# Patient Record
Sex: FEMALE | Race: WHITE | Employment: OTHER
[De-identification: names, ages, dates, MRNs, and addresses within clinical notes are randomized per-mention and may not be internally consistent; named-entity substitution may affect disease eponyms.]

---

## 2017-01-27 ENCOUNTER — TELEPHONE (OUTPATIENT)
Dept: INTERNAL MEDICINE | Facility: CLINIC | Age: 62
End: 2017-01-27

## 2017-01-28 RX ORDER — CIPROFLOXACIN 500 MG/1
500 TABLET, FILM COATED ORAL 2 TIMES DAILY
Qty: 20 TABLET | Refills: 0 | Status: SHIPPED | OUTPATIENT
Start: 2017-01-28 | End: 2017-03-17

## 2017-02-27 ENCOUNTER — HOSPITAL ENCOUNTER (OUTPATIENT)
Age: 62
Setting detail: SPECIMEN
Discharge: HOME | End: 2017-02-27
Payer: COMMERCIAL

## 2017-02-27 ENCOUNTER — OFFICE VISIT (OUTPATIENT)
Dept: OBGYN | Facility: CLINIC | Age: 62
End: 2017-02-27

## 2017-02-27 VITALS
RESPIRATION RATE: 20 BRPM | HEIGHT: 67 IN | WEIGHT: 182 LBS | BODY MASS INDEX: 28.56 KG/M2 | SYSTOLIC BLOOD PRESSURE: 140 MMHG | HEART RATE: 74 BPM | DIASTOLIC BLOOD PRESSURE: 78 MMHG

## 2017-02-27 DIAGNOSIS — N76.0 ACUTE VAGINITIS: ICD-10-CM

## 2017-02-27 DIAGNOSIS — Z12.39 ENCOUNTER FOR BREAST CANCER SCREENING OTHER THAN MAMMOGRAM: ICD-10-CM

## 2017-02-27 DIAGNOSIS — Z01.419 WELL WOMAN EXAM WITH ROUTINE GYNECOLOGICAL EXAM: Primary | ICD-10-CM

## 2017-02-27 DIAGNOSIS — Z90.710 H/O HYSTERECTOMY FOR BENIGN DISEASE: ICD-10-CM

## 2017-02-27 DIAGNOSIS — Z12.11 SCREEN FOR COLON CANCER: ICD-10-CM

## 2017-02-27 LAB
DIRECT EXAM: NORMAL
Lab: NORMAL
SPECIMEN DESCRIPTION: NORMAL
SPECIMEN DESCRIPTION: NORMAL
STATUS: NORMAL

## 2017-02-27 PROCEDURE — 87660 TRICHOMONAS VAGIN DIR PROBE: CPT

## 2017-02-27 PROCEDURE — 87510 GARDNER VAG DNA DIR PROBE: CPT

## 2017-02-27 PROCEDURE — 87480 CANDIDA DNA DIR PROBE: CPT

## 2017-02-27 PROCEDURE — 99386 PREV VISIT NEW AGE 40-64: CPT | Performed by: NURSE PRACTITIONER

## 2017-02-27 ASSESSMENT — PATIENT HEALTH QUESTIONNAIRE - PHQ9
SUM OF ALL RESPONSES TO PHQ9 QUESTIONS 1 & 2: 0
2. FEELING DOWN, DEPRESSED OR HOPELESS: 0
1. LITTLE INTEREST OR PLEASURE IN DOING THINGS: 0
SUM OF ALL RESPONSES TO PHQ QUESTIONS 1-9: 0

## 2017-03-17 ENCOUNTER — OFFICE VISIT (OUTPATIENT)
Dept: INTERNAL MEDICINE CLINIC | Age: 62
End: 2017-03-17
Payer: COMMERCIAL

## 2017-03-17 ENCOUNTER — HOSPITAL ENCOUNTER (OUTPATIENT)
Dept: WOMENS IMAGING | Age: 62
Discharge: HOME | End: 2017-03-17
Payer: COMMERCIAL

## 2017-03-17 VITALS
BODY MASS INDEX: 31.23 KG/M2 | DIASTOLIC BLOOD PRESSURE: 60 MMHG | WEIGHT: 199 LBS | SYSTOLIC BLOOD PRESSURE: 138 MMHG | HEIGHT: 67 IN

## 2017-03-17 DIAGNOSIS — E11.9 TYPE 2 DIABETES MELLITUS WITHOUT COMPLICATION, WITHOUT LONG-TERM CURRENT USE OF INSULIN (HCC): ICD-10-CM

## 2017-03-17 DIAGNOSIS — Z12.11 SCREENING FOR COLON CANCER: Primary | ICD-10-CM

## 2017-03-17 DIAGNOSIS — E83.52 HYPERCALCEMIA: ICD-10-CM

## 2017-03-17 DIAGNOSIS — E89.0 POSTSURGICAL HYPOTHYROIDISM: ICD-10-CM

## 2017-03-17 DIAGNOSIS — Z01.419 WELL WOMAN EXAM WITH ROUTINE GYNECOLOGICAL EXAM: ICD-10-CM

## 2017-03-17 DIAGNOSIS — J43.1 PANLOBULAR EMPHYSEMA (HCC): ICD-10-CM

## 2017-03-17 DIAGNOSIS — K21.9 GASTROESOPHAGEAL REFLUX DISEASE WITHOUT ESOPHAGITIS: ICD-10-CM

## 2017-03-17 DIAGNOSIS — Z12.39 ENCOUNTER FOR BREAST CANCER SCREENING OTHER THAN MAMMOGRAM: ICD-10-CM

## 2017-03-17 PROCEDURE — 99214 OFFICE O/P EST MOD 30 MIN: CPT | Performed by: INTERNAL MEDICINE

## 2017-03-17 PROCEDURE — 77063 BREAST TOMOSYNTHESIS BI: CPT

## 2017-03-17 RX ORDER — IPRATROPIUM BROMIDE 21 UG/1
2 SPRAY, METERED NASAL 3 TIMES DAILY
Qty: 1 BOTTLE | Refills: 3 | Status: SHIPPED | OUTPATIENT
Start: 2017-03-17 | End: 2018-10-18

## 2017-03-17 ASSESSMENT — ENCOUNTER SYMPTOMS
PHOTOPHOBIA: 0
FACIAL SWELLING: 0
STRIDOR: 0
COLOR CHANGE: 0
SHORTNESS OF BREATH: 1
BLOOD IN STOOL: 0
RHINORRHEA: 0
CONSTIPATION: 0
VOMITING: 0
BACK PAIN: 0
ABDOMINAL PAIN: 0
EYE ITCHING: 0
VOICE CHANGE: 0
TROUBLE SWALLOWING: 0
EYE PAIN: 0
DIARRHEA: 0
WHEEZING: 0
COUGH: 0
ABDOMINAL DISTENTION: 0
NAUSEA: 0
APNEA: 0
ANAL BLEEDING: 0
EYE DISCHARGE: 0
SORE THROAT: 0
CHOKING: 0
CHEST TIGHTNESS: 0
RECTAL PAIN: 0
SINUS PRESSURE: 0
EYE REDNESS: 0

## 2017-03-17 ASSESSMENT — PATIENT HEALTH QUESTIONNAIRE - PHQ9
1. LITTLE INTEREST OR PLEASURE IN DOING THINGS: 0
SUM OF ALL RESPONSES TO PHQ QUESTIONS 1-9: 0
2. FEELING DOWN, DEPRESSED OR HOPELESS: 0
SUM OF ALL RESPONSES TO PHQ9 QUESTIONS 1 & 2: 0

## 2017-04-17 ENCOUNTER — OFFICE VISIT (OUTPATIENT)
Dept: INTERNAL MEDICINE CLINIC | Age: 62
End: 2017-04-17
Payer: COMMERCIAL

## 2017-04-17 ENCOUNTER — HOSPITAL ENCOUNTER (OUTPATIENT)
Age: 62
Setting detail: SPECIMEN
Discharge: HOME | End: 2017-04-17
Payer: COMMERCIAL

## 2017-04-17 VITALS
DIASTOLIC BLOOD PRESSURE: 68 MMHG | HEIGHT: 67 IN | WEIGHT: 161 LBS | BODY MASS INDEX: 25.27 KG/M2 | SYSTOLIC BLOOD PRESSURE: 130 MMHG | HEART RATE: 89 BPM

## 2017-04-17 DIAGNOSIS — E11.9 TYPE 2 DIABETES MELLITUS WITHOUT COMPLICATION, WITHOUT LONG-TERM CURRENT USE OF INSULIN (HCC): Primary | ICD-10-CM

## 2017-04-17 DIAGNOSIS — I35.1 AORTIC EJECTION MURMUR: ICD-10-CM

## 2017-04-17 DIAGNOSIS — N30.01 ACUTE CYSTITIS WITH HEMATURIA: ICD-10-CM

## 2017-04-17 LAB — HBA1C MFR BLD: 6.4 %

## 2017-04-17 PROCEDURE — 99214 OFFICE O/P EST MOD 30 MIN: CPT | Performed by: INTERNAL MEDICINE

## 2017-04-17 PROCEDURE — 83036 HEMOGLOBIN GLYCOSYLATED A1C: CPT | Performed by: INTERNAL MEDICINE

## 2017-04-17 RX ORDER — CIPROFLOXACIN 500 MG/1
500 TABLET, FILM COATED ORAL 2 TIMES DAILY
Qty: 20 TABLET | Refills: 0 | Status: SHIPPED | OUTPATIENT
Start: 2017-04-17 | End: 2017-07-13 | Stop reason: SDUPTHER

## 2017-04-18 LAB
-: ABNORMAL
AMORPHOUS: ABNORMAL
BACTERIA: ABNORMAL
BILIRUBIN URINE: NEGATIVE
CASTS UA: ABNORMAL /LPF (ref 0–2)
COLOR: ABNORMAL
COMMENT UA: ABNORMAL
CRYSTALS, UA: ABNORMAL /HPF
EPITHELIAL CELLS UA: ABNORMAL /HPF (ref 0–5)
GLUCOSE URINE: ABNORMAL
KETONES, URINE: ABNORMAL
LEUKOCYTE ESTERASE, URINE: NEGATIVE
MUCUS: ABNORMAL
NITRITE, URINE: NEGATIVE
OTHER OBSERVATIONS UA: ABNORMAL
PH UA: 6 (ref 5–8)
PROTEIN UA: NEGATIVE
RBC UA: ABNORMAL /HPF (ref 0–2)
RENAL EPITHELIAL, UA: ABNORMAL /HPF
SPECIFIC GRAVITY UA: 1.03 (ref 1–1.03)
TRICHOMONAS: ABNORMAL
TURBIDITY: ABNORMAL
URINE HGB: NEGATIVE
UROBILINOGEN, URINE: NORMAL
WBC UA: ABNORMAL /HPF (ref 0–5)
YEAST: ABNORMAL

## 2017-04-19 LAB
CULTURE: NORMAL
CULTURE: NORMAL
Lab: NORMAL
SPECIMEN DESCRIPTION: NORMAL
STATUS: NORMAL

## 2017-04-23 ASSESSMENT — ENCOUNTER SYMPTOMS
APNEA: 0
EYE ITCHING: 0
EYE REDNESS: 0
EYE PAIN: 0
CHOKING: 0
BACK PAIN: 0
CHEST TIGHTNESS: 0
EYE DISCHARGE: 0
ABDOMINAL DISTENTION: 0
ABDOMINAL PAIN: 1
COUGH: 0
ANAL BLEEDING: 0

## 2017-05-05 ENCOUNTER — HOSPITAL ENCOUNTER (OUTPATIENT)
Dept: CT IMAGING | Facility: CLINIC | Age: 62
Discharge: HOME | End: 2017-05-05
Payer: COMMERCIAL

## 2017-05-05 DIAGNOSIS — N30.01 ACUTE CYSTITIS WITH HEMATURIA: ICD-10-CM

## 2017-05-05 PROCEDURE — 74176 CT ABD & PELVIS W/O CONTRAST: CPT

## 2017-06-30 ENCOUNTER — OFFICE VISIT (OUTPATIENT)
Dept: INTERNAL MEDICINE CLINIC | Age: 62
End: 2017-06-30
Payer: COMMERCIAL

## 2017-06-30 VITALS
HEIGHT: 67 IN | SYSTOLIC BLOOD PRESSURE: 130 MMHG | DIASTOLIC BLOOD PRESSURE: 68 MMHG | BODY MASS INDEX: 29.66 KG/M2 | WEIGHT: 189 LBS

## 2017-06-30 DIAGNOSIS — E89.0 POSTSURGICAL HYPOTHYROIDISM: ICD-10-CM

## 2017-06-30 DIAGNOSIS — J43.1 PANLOBULAR EMPHYSEMA (HCC): ICD-10-CM

## 2017-06-30 DIAGNOSIS — K21.9 GASTROESOPHAGEAL REFLUX DISEASE WITHOUT ESOPHAGITIS: ICD-10-CM

## 2017-06-30 DIAGNOSIS — Z13.9 SCREENING: ICD-10-CM

## 2017-06-30 DIAGNOSIS — E11.9 TYPE 2 DIABETES MELLITUS WITHOUT COMPLICATION, WITHOUT LONG-TERM CURRENT USE OF INSULIN (HCC): Primary | ICD-10-CM

## 2017-06-30 DIAGNOSIS — R10.84 GENERALIZED ABDOMINAL PAIN: ICD-10-CM

## 2017-06-30 PROCEDURE — 99214 OFFICE O/P EST MOD 30 MIN: CPT | Performed by: INTERNAL MEDICINE

## 2017-06-30 ASSESSMENT — ENCOUNTER SYMPTOMS
BLOOD IN STOOL: 0
RHINORRHEA: 0
SINUS PRESSURE: 0
CHEST TIGHTNESS: 0
BACK PAIN: 0
RECTAL PAIN: 0
ANAL BLEEDING: 0
CONSTIPATION: 0
EYE REDNESS: 0
NAUSEA: 0
EYE ITCHING: 0
SHORTNESS OF BREATH: 1
COUGH: 0
EYE DISCHARGE: 0
EYE PAIN: 0
VOMITING: 0
DIARRHEA: 0
WHEEZING: 0
VOICE CHANGE: 0
PHOTOPHOBIA: 0
STRIDOR: 0
COLOR CHANGE: 0
FACIAL SWELLING: 0
ABDOMINAL DISTENTION: 0
ABDOMINAL PAIN: 0
APNEA: 0
SORE THROAT: 0
CHOKING: 0
TROUBLE SWALLOWING: 0

## 2017-07-13 DIAGNOSIS — N30.01 ACUTE CYSTITIS WITH HEMATURIA: ICD-10-CM

## 2017-07-14 RX ORDER — CIPROFLOXACIN 500 MG/1
TABLET, FILM COATED ORAL
Qty: 20 TABLET | Refills: 0 | Status: SHIPPED | OUTPATIENT
Start: 2017-07-14 | End: 2018-04-24

## 2017-10-27 ENCOUNTER — OFFICE VISIT (OUTPATIENT)
Dept: INTERNAL MEDICINE CLINIC | Age: 62
End: 2017-10-27
Payer: COMMERCIAL

## 2017-10-27 VITALS
BODY MASS INDEX: 28.41 KG/M2 | DIASTOLIC BLOOD PRESSURE: 70 MMHG | WEIGHT: 181 LBS | HEIGHT: 67 IN | SYSTOLIC BLOOD PRESSURE: 136 MMHG

## 2017-10-27 DIAGNOSIS — E89.0 POSTOPERATIVE HYPOTHYROIDISM: ICD-10-CM

## 2017-10-27 DIAGNOSIS — E11.9 TYPE 2 DIABETES MELLITUS WITHOUT COMPLICATION, WITHOUT LONG-TERM CURRENT USE OF INSULIN (HCC): Primary | ICD-10-CM

## 2017-10-27 DIAGNOSIS — Z12.11 SCREENING FOR COLON CANCER: ICD-10-CM

## 2017-10-27 DIAGNOSIS — K21.9 GASTROESOPHAGEAL REFLUX DISEASE WITHOUT ESOPHAGITIS: ICD-10-CM

## 2017-10-27 DIAGNOSIS — J43.1 PANLOBULAR EMPHYSEMA (HCC): ICD-10-CM

## 2017-10-27 PROCEDURE — 99214 OFFICE O/P EST MOD 30 MIN: CPT | Performed by: INTERNAL MEDICINE

## 2017-10-27 ASSESSMENT — ENCOUNTER SYMPTOMS
STRIDOR: 0
DIARRHEA: 0
CHOKING: 0
ANAL BLEEDING: 0
RECTAL PAIN: 0
PHOTOPHOBIA: 0
NAUSEA: 0
APNEA: 0
BLOOD IN STOOL: 0
COLOR CHANGE: 0
EYE ITCHING: 0
BACK PAIN: 0
RHINORRHEA: 1
SORE THROAT: 0
CHEST TIGHTNESS: 0
EYE PAIN: 0
EYE DISCHARGE: 0
VOICE CHANGE: 0
SHORTNESS OF BREATH: 0
SINUS PRESSURE: 0
TROUBLE SWALLOWING: 0
VOMITING: 0
CONSTIPATION: 0
FACIAL SWELLING: 0
COUGH: 0
EYE REDNESS: 0
ABDOMINAL PAIN: 0
ABDOMINAL DISTENTION: 0
WHEEZING: 0

## 2017-10-27 NOTE — PROGRESS NOTES
Chronic Disease Visit Information    BP Readings from Last 3 Encounters:   06/30/17 130/68   04/17/17 130/68   03/17/17 138/60          Hemoglobin A1C (%)   Date Value   04/17/2017 6.4   10/21/2016 6.6 (H)   02/26/2016 6.8 (H)     Microalb/Crt. Ratio (mcg/mg creat)   Date Value   10/21/2016 6     LDL Cholesterol (mg/dL)   Date Value   10/21/2016 99     HDL (mg/dL)   Date Value   10/21/2016 59     BUN (mg/dL)   Date Value   10/21/2016 12     CREATININE (mg/dL)   Date Value   10/21/2016 0.77     Glucose (mg/dL)   Date Value   10/21/2016 130 (H)   03/09/2012 103            Have you changed or started any medications since your last visit including any over-the-counter medicines, vitamins, or herbal medicines? no   Are you having any side effects from any of your medications? -  no  Have you stopped taking any of your medications? Is so, why? -  no    Have you seen any other physician or provider since your last visit? No  Have you had any other diagnostic tests since your last visit? No  Have you been seen in the emergency room and/or had an admission to a hospital since we last saw you? No  Have you had your annual diabetic retinal (eye) exam? No  Have you had your routine dental cleaning in the past 6 months? no    Have you activated your [employer] account? If not, what are your barriers?  Yes     Patient Care Team:  Leah Bertrand MD as PCP - Bernaaydin Gallagher MD as PCP - [facility] Attributed Provider         Medical History Review  Past Medical, Family, and Social History reviewed and does contribute to the patient presenting condition  Chief Complaint   Patient presents with    COPD     management     Diabetes     last a1c 6.4%     Health Maintenance   Topic Date Due    Hepatitis C screen  1955    HIV screen  12/24/1968    DTaP/Tdap/Td vaccine (1 - Tdap) 12/24/1972    Pneumococcal med risk (1 of 1 - PPSV23) 12/24/1972    Colon cancer screen colonoscopy  12/24/2003    Diabetic foot exam  07/08/2017    Diabetic retinal exam  08/12/2017    Flu vaccine (1) 09/01/2017    Diabetic microalbuminuria test  10/21/2017    Lipid screen  10/21/2017    DEXA (modify frequency per FRAX score)  11/11/2017    Diabetic hemoglobin A1C test  04/17/2018    Breast cancer screen  03/17/2019    Zostavax vaccine  Addressed

## 2017-10-27 NOTE — PROGRESS NOTES
heard.  Pulmonary/Chest: Effort normal. No respiratory distress. She has decreased breath sounds. She has no wheezes. She has no rales. She exhibits no tenderness. Abdominal: Soft. Bowel sounds are normal. She exhibits no distension and no mass. There is no tenderness. There is no rebound and no guarding. Genitourinary: Rectal exam shows guaiac negative stool. No vaginal discharge found. Musculoskeletal: She exhibits tenderness. She exhibits no edema. Lymphadenopathy:     She has no cervical adenopathy. Neurological: She is alert and oriented to person, place, and time. She has normal reflexes. No cranial nerve deficit. She exhibits normal muscle tone. Coordination normal.   Skin: Skin is warm and dry. No rash noted. She is not diaphoretic. No erythema. No pallor. Psychiatric: Her behavior is normal. Judgment and thought content normal. Her mood appears anxious. Results for orders placed or performed during the hospital encounter of 04/17/17   Urine culture clean catch   Result Value Ref Range    Specimen Description . CLEAN CATCH URINE     Special Requests NOT REPORTED     Culture NO SIGNIFICANT GROWTH     Culture       [employer] [address] [place] [address], [address] [place] [address] [phone]    Status FINAL 04/19/2017    UA W/REFLEX CULTURE   Result Value Ref Range    Color, UA DARK YELLOW (A) YEL    Turbidity UA CLOUDY (A) CLEAR    Glucose, Ur 1+ (A) NEG    Bilirubin Urine NEGATIVE NEG    Ketones, Urine TRACE (A) NEG    Specific Gravity, UA 1.029 1.005 - 1.030    Urine Hgb NEGATIVE NEG    pH, UA 6.0 5.0 - 8.0    Protein, UA NEGATIVE NEG    Urobilinogen, Urine Normal NORM    Nitrite, Urine NEGATIVE NEG    Leukocyte Esterase, Urine NEGATIVE NEG    Urinalysis Comments Culture ordered based on defined criteria.     Microscopic Urinalysis   Result Value Ref Range    -          WBC, UA None 0 - 5 /HPF    RBC, UA None 0 - 2 /HPF    Casts UA NOT REPORTED 0 - 2 /LPF    Crystals UA MANY (A) NONE /HPF Epithelial Cells UA 2 TO 5 0 - 5 /HPF    Renal Epithelial, Urine NOT REPORTED 0 /HPF    Bacteria, UA MODERATE (A) NONE    Mucus, UA 2+ (A) NONE    Trichomonas, UA NOT REPORTED NONE    Amorphous, UA NOT REPORTED NONE    Other Observations UA NOT REPORTED NREQ    Yeast, UA NOT REPORTED NONE     No results found. Assessment:     1. Type 2 diabetes mellitus without complication, without long-term current use of insulin (HCC)   DIABETES FOOT EXAM recheck hba1c last 6.4 to get eye exam foot exqam nl    Microalbumin, Ur    Creatinine, Random Urine    Hemoglobin A1C    Lipid Panel    Comprehensive Metabolic Panel    CBC Auto Differential    Urinalysis   2. Panlobular emphysema (HCC)  Baseline cont same   3. Postoperative hypothyroidism  TSH without Reflex recheck tsh cont same dose    Vitamin D 25 Hydroxy   4. Gastroesophageal reflux disease without esophagitis   stable contb same   5. Screening for colon cancer  POCT Fecal Immunochemical Test (FIT)         Plan:     Orders Placed This Encounter   Procedures    Microalbumin, Ur     Standing Status:   Future     Standing Expiration Date:   10/27/2018    Creatinine, Random Urine     Standing Status:   Future     Standing Expiration Date:   10/27/2018    Hemoglobin A1C     Standing Status:   Future     Standing Expiration Date:   10/27/2018    TSH without Reflex     Standing Status:   Future     Standing Expiration Date:   10/27/2018    Lipid Panel     Standing Status:   Future     Standing Expiration Date:   10/27/2018     Order Specific Question:   Is Patient Fasting?/# of Hours     Answer:   12    Comprehensive Metabolic Panel     Standing Status:   Future     Standing Expiration Date:   10/27/2018    CBC Auto Differential     Standing Status:   Future     Standing Expiration Date:   10/27/2018    Urinalysis     Standing Status:   Future     Standing Expiration Date:   10/27/2018     Order Specific Question:   SPECIFY(EX-CATH,MIDSTREAM,CYSTO,ETC)?      Answer:

## 2017-12-26 DIAGNOSIS — E89.0 POSTOPERATIVE HYPOTHYROIDISM: ICD-10-CM

## 2017-12-26 RX ORDER — LEVOTHYROXINE SODIUM 0.12 MG/1
125 TABLET ORAL DAILY
Qty: 90 TABLET | Refills: 3 | Status: SHIPPED | OUTPATIENT
Start: 2017-12-26 | End: 2017-12-29 | Stop reason: SDUPTHER

## 2017-12-29 DIAGNOSIS — E89.0 POSTOPERATIVE HYPOTHYROIDISM: ICD-10-CM

## 2017-12-29 RX ORDER — LEVOTHYROXINE SODIUM 0.12 MG/1
125 TABLET ORAL DAILY
Qty: 90 TABLET | Refills: 3 | Status: SHIPPED | OUTPATIENT
Start: 2017-12-29 | End: 2019-01-08 | Stop reason: SDUPTHER

## 2018-04-05 ENCOUNTER — TELEPHONE (OUTPATIENT)
Dept: INTERNAL MEDICINE CLINIC | Age: 63
End: 2018-04-05

## 2018-04-09 ENCOUNTER — HOSPITAL ENCOUNTER (OUTPATIENT)
Age: 63
Setting detail: SPECIMEN
Discharge: HOME | End: 2018-04-09
Payer: COMMERCIAL

## 2018-04-09 DIAGNOSIS — E11.9 TYPE 2 DIABETES MELLITUS WITHOUT COMPLICATION, WITHOUT LONG-TERM CURRENT USE OF INSULIN (HCC): ICD-10-CM

## 2018-04-09 DIAGNOSIS — E89.0 POSTOPERATIVE HYPOTHYROIDISM: ICD-10-CM

## 2018-04-09 LAB
-: ABNORMAL
ABSOLUTE EOS #: 0.08 K/UL (ref 0–0.44)
ABSOLUTE IMMATURE GRANULOCYTE: <0.03 K/UL (ref 0–0.3)
ABSOLUTE LYMPH #: 1.54 K/UL (ref 1.1–3.7)
ABSOLUTE MONO #: 0.45 K/UL (ref 0.1–1.2)
ALBUMIN SERPL-MCNC: 4.3 G/DL (ref 3.5–5.2)
ALBUMIN/GLOBULIN RATIO: 1.7 (ref 1–2.5)
ALP BLD-CCNC: 81 U/L (ref 35–104)
ALT SERPL-CCNC: 36 U/L (ref 5–33)
AMORPHOUS: ABNORMAL
ANION GAP SERPL CALCULATED.3IONS-SCNC: 11 MMOL/L (ref 9–17)
AST SERPL-CCNC: 26 U/L
BACTERIA: ABNORMAL
BASOPHILS # BLD: 1 % (ref 0–2)
BASOPHILS ABSOLUTE: 0.04 K/UL (ref 0–0.2)
BILIRUB SERPL-MCNC: 0.42 MG/DL (ref 0.3–1.2)
BILIRUBIN URINE: NEGATIVE
BUN BLDV-MCNC: 13 MG/DL (ref 8–23)
BUN/CREAT BLD: ABNORMAL (ref 9–20)
CALCIUM SERPL-MCNC: 8.8 MG/DL (ref 8.6–10.4)
CASTS UA: ABNORMAL /LPF (ref 0–2)
CHLORIDE BLD-SCNC: 102 MMOL/L (ref 98–107)
CHOLESTEROL/HDL RATIO: 2.7
CHOLESTEROL: 166 MG/DL
CO2: 28 MMOL/L (ref 20–31)
COLOR: YELLOW
COMMENT UA: ABNORMAL
CREAT SERPL-MCNC: 0.68 MG/DL (ref 0.5–0.9)
CREATININE URINE: 224.8 MG/DL (ref 28–217)
CRYSTALS, UA: ABNORMAL /HPF
DIFFERENTIAL TYPE: ABNORMAL
EOSINOPHILS RELATIVE PERCENT: 2 % (ref 1–4)
EPITHELIAL CELLS UA: ABNORMAL /HPF (ref 0–5)
ESTIMATED AVERAGE GLUCOSE: 128 MG/DL
GFR AFRICAN AMERICAN: >60 ML/MIN
GFR NON-AFRICAN AMERICAN: >60 ML/MIN
GFR SERPL CREATININE-BSD FRML MDRD: ABNORMAL ML/MIN/{1.73_M2}
GFR SERPL CREATININE-BSD FRML MDRD: ABNORMAL ML/MIN/{1.73_M2}
GLUCOSE BLD-MCNC: 122 MG/DL (ref 70–99)
GLUCOSE URINE: NEGATIVE
HBA1C MFR BLD: 6.1 % (ref 4–6)
HCT VFR BLD CALC: 47.3 % (ref 36.3–47.1)
HDLC SERPL-MCNC: 61 MG/DL
HEMOGLOBIN: 15.3 G/DL (ref 11.9–15.1)
IMMATURE GRANULOCYTES: 0 %
KETONES, URINE: NEGATIVE
LDL CHOLESTEROL: 84 MG/DL (ref 0–130)
LEUKOCYTE ESTERASE, URINE: ABNORMAL
LYMPHOCYTES # BLD: 35 % (ref 24–43)
MCH RBC QN AUTO: 30.7 PG (ref 25.2–33.5)
MCHC RBC AUTO-ENTMCNC: 32.3 G/DL (ref 28.4–34.8)
MCV RBC AUTO: 94.8 FL (ref 82.6–102.9)
MICROALBUMIN/CREAT 24H UR: 15 MG/L
MICROALBUMIN/CREAT UR-RTO: 7 MCG/MG CREAT
MONOCYTES # BLD: 10 % (ref 3–12)
MUCUS: ABNORMAL
NITRITE, URINE: NEGATIVE
NRBC AUTOMATED: 0 PER 100 WBC
OTHER OBSERVATIONS UA: ABNORMAL
PDW BLD-RTO: 12.6 % (ref 11.8–14.4)
PH UA: 6.5 (ref 5–8)
PLATELET # BLD: 226 K/UL (ref 138–453)
PLATELET ESTIMATE: ABNORMAL
PMV BLD AUTO: 11.1 FL (ref 8.1–13.5)
POTASSIUM SERPL-SCNC: 4.5 MMOL/L (ref 3.7–5.3)
PROTEIN UA: NEGATIVE
RBC # BLD: 4.99 M/UL (ref 3.95–5.11)
RBC # BLD: ABNORMAL 10*6/UL
RBC UA: ABNORMAL /HPF (ref 0–2)
RENAL EPITHELIAL, UA: ABNORMAL /HPF
SEG NEUTROPHILS: 52 % (ref 36–65)
SEGMENTED NEUTROPHILS ABSOLUTE COUNT: 2.3 K/UL (ref 1.5–8.1)
SODIUM BLD-SCNC: 141 MMOL/L (ref 135–144)
SPECIFIC GRAVITY UA: 1.02 (ref 1–1.03)
TOTAL PROTEIN: 6.8 G/DL (ref 6.4–8.3)
TRICHOMONAS: ABNORMAL
TRIGL SERPL-MCNC: 105 MG/DL
TSH SERPL DL<=0.05 MIU/L-ACNC: 0.14 MIU/L (ref 0.3–5)
TURBIDITY: CLEAR
URINE HGB: NEGATIVE
UROBILINOGEN, URINE: NORMAL
VITAMIN D 25-HYDROXY: 37.5 NG/ML (ref 30–100)
VLDLC SERPL CALC-MCNC: NORMAL MG/DL (ref 1–30)
WBC # BLD: 4.4 K/UL (ref 3.5–11.3)
WBC # BLD: ABNORMAL 10*3/UL
WBC UA: ABNORMAL /HPF (ref 0–5)
YEAST: ABNORMAL

## 2018-04-21 DIAGNOSIS — Z78.0 MENOPAUSE: ICD-10-CM

## 2018-04-23 DIAGNOSIS — Z78.0 MENOPAUSE: ICD-10-CM

## 2018-04-23 RX ORDER — ESTRADIOL 0.03 MG/D
PATCH TRANSDERMAL
Qty: 12 PATCH | Refills: 4 | Status: SHIPPED | OUTPATIENT
Start: 2018-04-23 | End: 2018-04-23 | Stop reason: SDUPTHER

## 2018-04-23 RX ORDER — ESTRADIOL 0.03 MG/D
1 PATCH TRANSDERMAL WEEKLY
Qty: 12 PATCH | Refills: 4 | Status: SHIPPED | OUTPATIENT
Start: 2018-04-23 | End: 2019-01-08 | Stop reason: SDUPTHER

## 2018-04-24 ENCOUNTER — OFFICE VISIT (OUTPATIENT)
Dept: INTERNAL MEDICINE CLINIC | Age: 63
End: 2018-04-24
Payer: COMMERCIAL

## 2018-04-24 ENCOUNTER — TELEPHONE (OUTPATIENT)
Dept: INTERNAL MEDICINE CLINIC | Age: 63
End: 2018-04-24

## 2018-04-24 VITALS
HEIGHT: 67 IN | DIASTOLIC BLOOD PRESSURE: 62 MMHG | BODY MASS INDEX: 29.82 KG/M2 | SYSTOLIC BLOOD PRESSURE: 130 MMHG | WEIGHT: 190 LBS

## 2018-04-24 DIAGNOSIS — Z12.39 BREAST CANCER SCREENING, HIGH RISK PATIENT: Primary | ICD-10-CM

## 2018-04-24 DIAGNOSIS — J43.1 PANLOBULAR EMPHYSEMA (HCC): ICD-10-CM

## 2018-04-24 DIAGNOSIS — E11.9 TYPE 2 DIABETES MELLITUS WITHOUT COMPLICATION, WITHOUT LONG-TERM CURRENT USE OF INSULIN (HCC): ICD-10-CM

## 2018-04-24 DIAGNOSIS — E89.0 POSTSURGICAL HYPOTHYROIDISM: ICD-10-CM

## 2018-04-24 DIAGNOSIS — Z12.11 SCREENING FOR COLON CANCER: ICD-10-CM

## 2018-04-24 DIAGNOSIS — Z78.0 POST-MENOPAUSAL: ICD-10-CM

## 2018-04-24 DIAGNOSIS — E55.9 VITAMIN D DEFICIENCY: ICD-10-CM

## 2018-04-24 PROCEDURE — 99214 OFFICE O/P EST MOD 30 MIN: CPT | Performed by: INTERNAL MEDICINE

## 2018-04-24 RX ORDER — FLUTICASONE PROPIONATE 50 MCG
1 SPRAY, SUSPENSION (ML) NASAL DAILY
Qty: 1 BOTTLE | Refills: 3 | Status: SHIPPED | OUTPATIENT
Start: 2018-04-24 | End: 2018-04-25

## 2018-04-24 RX ORDER — FLUTICASONE PROPIONATE 50 MCG
1 SPRAY, SUSPENSION (ML) NASAL DAILY
Qty: 1 BOTTLE | Refills: 3 | Status: SHIPPED | OUTPATIENT
Start: 2018-04-24 | End: 2018-10-18

## 2018-04-24 ASSESSMENT — ENCOUNTER SYMPTOMS
EYE PAIN: 0
COUGH: 0
CHEST TIGHTNESS: 0
CHOKING: 0
COLOR CHANGE: 0
DIARRHEA: 0
BACK PAIN: 1
TROUBLE SWALLOWING: 0
NAUSEA: 0
PHOTOPHOBIA: 0
SORE THROAT: 0
VOICE CHANGE: 0
WHEEZING: 0
SINUS PRESSURE: 0
ABDOMINAL DISTENTION: 0
EYE REDNESS: 0
STRIDOR: 0
EYE DISCHARGE: 0
BLOOD IN STOOL: 0
RHINORRHEA: 1
EYE ITCHING: 0
VOMITING: 0
SHORTNESS OF BREATH: 1
APNEA: 0
ANAL BLEEDING: 0
FACIAL SWELLING: 0
CONSTIPATION: 0
ABDOMINAL PAIN: 0
RECTAL PAIN: 0

## 2018-04-24 ASSESSMENT — PATIENT HEALTH QUESTIONNAIRE - PHQ9
1. LITTLE INTEREST OR PLEASURE IN DOING THINGS: 0
SUM OF ALL RESPONSES TO PHQ QUESTIONS 1-9: 0
SUM OF ALL RESPONSES TO PHQ9 QUESTIONS 1 & 2: 0
2. FEELING DOWN, DEPRESSED OR HOPELESS: 0

## 2018-05-02 ENCOUNTER — HOSPITAL ENCOUNTER (OUTPATIENT)
Dept: WOMENS IMAGING | Age: 63
Discharge: HOME | End: 2018-05-04
Payer: COMMERCIAL

## 2018-05-02 DIAGNOSIS — Z12.39 BREAST CANCER SCREENING, HIGH RISK PATIENT: ICD-10-CM

## 2018-05-02 PROCEDURE — 77063 BREAST TOMOSYNTHESIS BI: CPT

## 2018-05-25 ENCOUNTER — TELEPHONE (OUTPATIENT)
Dept: INTERNAL MEDICINE CLINIC | Age: 63
End: 2018-05-25

## 2018-10-18 ENCOUNTER — OFFICE VISIT (OUTPATIENT)
Dept: INTERNAL MEDICINE CLINIC | Age: 63
End: 2018-10-18
Payer: COMMERCIAL

## 2018-10-18 VITALS
HEART RATE: 72 BPM | SYSTOLIC BLOOD PRESSURE: 123 MMHG | DIASTOLIC BLOOD PRESSURE: 73 MMHG | HEIGHT: 67 IN | WEIGHT: 187 LBS | BODY MASS INDEX: 29.35 KG/M2

## 2018-10-18 DIAGNOSIS — J30.2 SEASONAL ALLERGIC RHINITIS, UNSPECIFIED TRIGGER: ICD-10-CM

## 2018-10-18 DIAGNOSIS — E11.9 TYPE 2 DIABETES MELLITUS WITHOUT COMPLICATION, WITHOUT LONG-TERM CURRENT USE OF INSULIN (HCC): ICD-10-CM

## 2018-10-18 DIAGNOSIS — J43.1 PANLOBULAR EMPHYSEMA (HCC): Primary | ICD-10-CM

## 2018-10-18 DIAGNOSIS — Z78.0 POST-MENOPAUSAL: ICD-10-CM

## 2018-10-18 DIAGNOSIS — Z12.11 SCREENING FOR COLON CANCER: ICD-10-CM

## 2018-10-18 DIAGNOSIS — E89.0 POSTOPERATIVE HYPOTHYROIDISM: ICD-10-CM

## 2018-10-18 PROCEDURE — 99214 OFFICE O/P EST MOD 30 MIN: CPT | Performed by: INTERNAL MEDICINE

## 2018-10-18 RX ORDER — FLUTICASONE PROPIONATE 50 MCG
1 SPRAY, SUSPENSION (ML) NASAL DAILY
Qty: 1 BOTTLE | Refills: 3 | Status: SHIPPED | OUTPATIENT
Start: 2018-10-18 | End: 2018-10-29

## 2018-10-18 ASSESSMENT — ENCOUNTER SYMPTOMS
CHEST TIGHTNESS: 0
CHOKING: 0
RHINORRHEA: 1
FACIAL SWELLING: 0
PHOTOPHOBIA: 0
WHEEZING: 0
BACK PAIN: 0
EYE PAIN: 0
EYE REDNESS: 0
RECTAL PAIN: 0
NAUSEA: 0
ABDOMINAL PAIN: 0
COUGH: 0
SORE THROAT: 0
VOICE CHANGE: 0
SHORTNESS OF BREATH: 1
BLOOD IN STOOL: 0
ABDOMINAL DISTENTION: 0
EYE DISCHARGE: 0
STRIDOR: 0
ANAL BLEEDING: 0
COLOR CHANGE: 0
EYE ITCHING: 0
SINUS PRESSURE: 0
APNEA: 0
CONSTIPATION: 0
TROUBLE SWALLOWING: 0
VOMITING: 0
DIARRHEA: 0

## 2018-10-18 NOTE — PROGRESS NOTES
Concern    None     Social History Narrative    None       Current Outpatient Prescriptions   Medication Sig Dispense Refill    fluticasone (FLONASE) 50 MCG/ACT nasal spray 1 spray by Nasal route daily 1 Bottle 3    CLIMARA 0.025 MG/24HR Place 1 patch onto the skin once a week 12 patch 4    levothyroxine (SYNTHROID) 125 MCG tablet Take 1 tablet by mouth daily 90 tablet 3    esomeprazole (NEXIUM) 40 MG delayed release capsule Take 1 capsule by mouth every morning (before breakfast) 90 capsule 3    sodium chloride (OCEAN, BABY AYR) 0.65 % nasal spray 1 spray by Nasal route as needed for Congestion      aspirin 81 MG tablet Take 1 tablet by mouth daily (with breakfast) 30 tablet 11    fluticasone (FLONASE) 50 MCG/ACT nasal spray 2 sprays by Nasal route daily 1 Bottle 3    Vitamin D (CHOLECALCIFEROL) 1000 UNITS CAPS capsule Take 1,000 Units by mouth daily.  cetirizine (ZYRTEC) 10 MG tablet Take 10 mg by mouth daily.  fexofenadine (ALLEGRA ALLERGY) 180 MG tablet Take 180 mg by mouth daily. No current facility-administered medications for this visit. Review of Systems:    Review of Systems   Constitutional: Negative for activity change, appetite change, chills, diaphoresis, fatigue and fever. HENT: Positive for congestion and rhinorrhea. Negative for dental problem, drooling, ear discharge, ear pain, facial swelling, hearing loss, mouth sores, nosebleeds, postnasal drip, sinus pressure, sneezing, sore throat, tinnitus, trouble swallowing and voice change. Eyes: Negative for photophobia, pain, discharge, redness, itching and visual disturbance. Respiratory: Positive for shortness of breath. Negative for apnea, cough, choking, chest tightness, wheezing and stridor. Cardiovascular: Negative for chest pain, palpitations and leg swelling.    Gastrointestinal: Negative for abdominal distention, abdominal pain, anal bleeding, blood in stool, constipation, diarrhea, nausea, rectal pain

## 2018-10-26 ENCOUNTER — HOSPITAL ENCOUNTER (OUTPATIENT)
Age: 63
Setting detail: SPECIMEN
Discharge: HOME | End: 2018-10-26
Payer: COMMERCIAL

## 2018-10-26 DIAGNOSIS — J30.2 SEASONAL ALLERGIC RHINITIS, UNSPECIFIED TRIGGER: ICD-10-CM

## 2018-10-26 LAB — TSH SERPL DL<=0.05 MIU/L-ACNC: 0.1 MIU/L (ref 0.3–5)

## 2018-10-29 ENCOUNTER — OFFICE VISIT (OUTPATIENT)
Dept: INTERNAL MEDICINE CLINIC | Age: 63
End: 2018-10-29
Payer: COMMERCIAL

## 2018-10-29 VITALS
DIASTOLIC BLOOD PRESSURE: 74 MMHG | HEART RATE: 75 BPM | BODY MASS INDEX: 29.03 KG/M2 | WEIGHT: 185 LBS | OXYGEN SATURATION: 98 % | HEIGHT: 67 IN | TEMPERATURE: 97.7 F | SYSTOLIC BLOOD PRESSURE: 138 MMHG

## 2018-10-29 DIAGNOSIS — J30.9 ALLERGIC RHINITIS, UNSPECIFIED SEASONALITY, UNSPECIFIED TRIGGER: ICD-10-CM

## 2018-10-29 DIAGNOSIS — J20.9 ACUTE BRONCHITIS, UNSPECIFIED ORGANISM: Primary | ICD-10-CM

## 2018-10-29 PROCEDURE — 99213 OFFICE O/P EST LOW 20 MIN: CPT | Performed by: PHYSICIAN ASSISTANT

## 2018-10-29 RX ORDER — BENZONATATE 100 MG/1
100 CAPSULE ORAL 3 TIMES DAILY PRN
Qty: 21 CAPSULE | Refills: 0 | Status: SHIPPED | OUTPATIENT
Start: 2018-10-29 | End: 2018-11-05

## 2018-10-29 RX ORDER — AZITHROMYCIN 250 MG/1
TABLET, FILM COATED ORAL
Qty: 1 PACKET | Refills: 0 | Status: SHIPPED | OUTPATIENT
Start: 2018-10-29 | End: 2018-11-08

## 2018-10-29 ASSESSMENT — ENCOUNTER SYMPTOMS
SORE THROAT: 1
SHORTNESS OF BREATH: 0
ABDOMINAL PAIN: 0
WHEEZING: 0
EYE REDNESS: 0
NAUSEA: 0
SINUS PRESSURE: 1
VOMITING: 0
EYE DISCHARGE: 0
CHEST TIGHTNESS: 0
EYE PAIN: 0
COUGH: 1
RHINORRHEA: 0

## 2019-01-08 ENCOUNTER — OFFICE VISIT (OUTPATIENT)
Dept: INTERNAL MEDICINE CLINIC | Age: 64
End: 2019-01-08
Payer: MEDICARE

## 2019-01-08 VITALS
BODY MASS INDEX: 30.22 KG/M2 | HEIGHT: 66 IN | DIASTOLIC BLOOD PRESSURE: 70 MMHG | WEIGHT: 188 LBS | HEART RATE: 79 BPM | SYSTOLIC BLOOD PRESSURE: 144 MMHG

## 2019-01-08 DIAGNOSIS — J30.2 OTHER SEASONAL ALLERGIC RHINITIS: ICD-10-CM

## 2019-01-08 DIAGNOSIS — E89.0 POSTOPERATIVE HYPOTHYROIDISM: ICD-10-CM

## 2019-01-08 DIAGNOSIS — J43.1 PANLOBULAR EMPHYSEMA (HCC): ICD-10-CM

## 2019-01-08 DIAGNOSIS — Z12.11 SCREENING FOR COLON CANCER: ICD-10-CM

## 2019-01-08 DIAGNOSIS — Z78.0 MENOPAUSE: ICD-10-CM

## 2019-01-08 DIAGNOSIS — E11.9 TYPE 2 DIABETES MELLITUS WITHOUT COMPLICATION, WITHOUT LONG-TERM CURRENT USE OF INSULIN (HCC): Primary | ICD-10-CM

## 2019-01-08 DIAGNOSIS — Z78.0 POST-MENOPAUSAL: ICD-10-CM

## 2019-01-08 DIAGNOSIS — E21.3 HYPERPARATHYROIDISM (HCC): ICD-10-CM

## 2019-01-08 PROCEDURE — 99214 OFFICE O/P EST MOD 30 MIN: CPT | Performed by: INTERNAL MEDICINE

## 2019-01-08 PROCEDURE — 3023F SPIROM DOC REV: CPT | Performed by: INTERNAL MEDICINE

## 2019-01-08 PROCEDURE — 1101F PT FALLS ASSESS-DOCD LE1/YR: CPT | Performed by: INTERNAL MEDICINE

## 2019-01-08 PROCEDURE — 4040F PNEUMOC VAC/ADMIN/RCVD: CPT | Performed by: INTERNAL MEDICINE

## 2019-01-08 PROCEDURE — G8926 SPIRO NO PERF OR DOC: HCPCS | Performed by: INTERNAL MEDICINE

## 2019-01-08 PROCEDURE — G8417 CALC BMI ABV UP PARAM F/U: HCPCS | Performed by: INTERNAL MEDICINE

## 2019-01-08 PROCEDURE — 1090F PRES/ABSN URINE INCON ASSESS: CPT | Performed by: INTERNAL MEDICINE

## 2019-01-08 PROCEDURE — 2022F DILAT RTA XM EVC RTNOPTHY: CPT | Performed by: INTERNAL MEDICINE

## 2019-01-08 PROCEDURE — 3017F COLORECTAL CA SCREEN DOC REV: CPT | Performed by: INTERNAL MEDICINE

## 2019-01-08 PROCEDURE — G8399 PT W/DXA RESULTS DOCUMENT: HCPCS | Performed by: INTERNAL MEDICINE

## 2019-01-08 PROCEDURE — 3046F HEMOGLOBIN A1C LEVEL >9.0%: CPT | Performed by: INTERNAL MEDICINE

## 2019-01-08 PROCEDURE — G8427 DOCREV CUR MEDS BY ELIG CLIN: HCPCS | Performed by: INTERNAL MEDICINE

## 2019-01-08 PROCEDURE — 4004F PT TOBACCO SCREEN RCVD TLK: CPT | Performed by: INTERNAL MEDICINE

## 2019-01-08 PROCEDURE — G8484 FLU IMMUNIZE NO ADMIN: HCPCS | Performed by: INTERNAL MEDICINE

## 2019-01-08 PROCEDURE — 1123F ACP DISCUSS/DSCN MKR DOCD: CPT | Performed by: INTERNAL MEDICINE

## 2019-01-08 RX ORDER — ESTRADIOL 0.03 MG/D
1 PATCH TRANSDERMAL WEEKLY
Qty: 12 PATCH | Refills: 3 | Status: SHIPPED | OUTPATIENT
Start: 2019-01-08 | End: 2019-02-25

## 2019-01-08 RX ORDER — LEVOTHYROXINE SODIUM 0.12 MG/1
125 TABLET ORAL DAILY
Qty: 90 TABLET | Refills: 3 | Status: SHIPPED | OUTPATIENT
Start: 2019-01-08

## 2019-01-08 RX ORDER — ESOMEPRAZOLE MAGNESIUM 40 MG/1
40 CAPSULE, DELAYED RELEASE ORAL
Qty: 90 CAPSULE | Refills: 3 | Status: SHIPPED | OUTPATIENT
Start: 2019-01-08

## 2019-01-08 RX ORDER — FLUTICASONE PROPIONATE 50 MCG
2 SPRAY, SUSPENSION (ML) NASAL DAILY
Qty: 3 BOTTLE | Refills: 3 | Status: SHIPPED | OUTPATIENT
Start: 2019-01-08

## 2019-01-08 ASSESSMENT — ENCOUNTER SYMPTOMS
SINUS PRESSURE: 0
CHOKING: 0
STRIDOR: 0
ABDOMINAL DISTENTION: 0
BLOOD IN STOOL: 0
SORE THROAT: 0
PHOTOPHOBIA: 0
DIARRHEA: 0
EYE ITCHING: 0
WHEEZING: 0
ABDOMINAL PAIN: 0
COUGH: 0
RHINORRHEA: 0
CONSTIPATION: 0
BACK PAIN: 0
NAUSEA: 0
CHEST TIGHTNESS: 0
ANAL BLEEDING: 0
VOMITING: 0
EYE DISCHARGE: 0
COLOR CHANGE: 0
EYE REDNESS: 0
RECTAL PAIN: 0
APNEA: 0
TROUBLE SWALLOWING: 0
SHORTNESS OF BREATH: 1
VOICE CHANGE: 0
EYE PAIN: 0
FACIAL SWELLING: 0

## 2019-01-08 ASSESSMENT — PATIENT HEALTH QUESTIONNAIRE - PHQ9
SUM OF ALL RESPONSES TO PHQ QUESTIONS 1-9: 0
SUM OF ALL RESPONSES TO PHQ QUESTIONS 1-9: 0
SUM OF ALL RESPONSES TO PHQ9 QUESTIONS 1 & 2: 0
1. LITTLE INTEREST OR PLEASURE IN DOING THINGS: 0
2. FEELING DOWN, DEPRESSED OR HOPELESS: 0

## 2019-01-29 ENCOUNTER — OFFICE VISIT (OUTPATIENT)
Dept: INTERNAL MEDICINE CLINIC | Age: 64
End: 2019-01-29
Payer: MEDICARE

## 2019-01-29 VITALS
TEMPERATURE: 98.7 F | HEIGHT: 66 IN | DIASTOLIC BLOOD PRESSURE: 80 MMHG | BODY MASS INDEX: 30.05 KG/M2 | WEIGHT: 187 LBS | SYSTOLIC BLOOD PRESSURE: 122 MMHG

## 2019-01-29 DIAGNOSIS — J44.9 CHRONIC OBSTRUCTIVE PULMONARY DISEASE, UNSPECIFIED COPD TYPE (HCC): ICD-10-CM

## 2019-01-29 DIAGNOSIS — R05.9 COUGH: ICD-10-CM

## 2019-01-29 DIAGNOSIS — F17.200 CURRENT EVERY DAY SMOKER: ICD-10-CM

## 2019-01-29 DIAGNOSIS — J06.9 UPPER RESPIRATORY TRACT INFECTION, UNSPECIFIED TYPE: Primary | ICD-10-CM

## 2019-01-29 PROCEDURE — G8926 SPIRO NO PERF OR DOC: HCPCS | Performed by: NURSE PRACTITIONER

## 2019-01-29 PROCEDURE — 1123F ACP DISCUSS/DSCN MKR DOCD: CPT | Performed by: NURSE PRACTITIONER

## 2019-01-29 PROCEDURE — 4004F PT TOBACCO SCREEN RCVD TLK: CPT | Performed by: NURSE PRACTITIONER

## 2019-01-29 PROCEDURE — G8417 CALC BMI ABV UP PARAM F/U: HCPCS | Performed by: NURSE PRACTITIONER

## 2019-01-29 PROCEDURE — G8399 PT W/DXA RESULTS DOCUMENT: HCPCS | Performed by: NURSE PRACTITIONER

## 2019-01-29 PROCEDURE — 3023F SPIROM DOC REV: CPT | Performed by: NURSE PRACTITIONER

## 2019-01-29 PROCEDURE — 1101F PT FALLS ASSESS-DOCD LE1/YR: CPT | Performed by: NURSE PRACTITIONER

## 2019-01-29 PROCEDURE — 4040F PNEUMOC VAC/ADMIN/RCVD: CPT | Performed by: NURSE PRACTITIONER

## 2019-01-29 PROCEDURE — G8484 FLU IMMUNIZE NO ADMIN: HCPCS | Performed by: NURSE PRACTITIONER

## 2019-01-29 PROCEDURE — 3017F COLORECTAL CA SCREEN DOC REV: CPT | Performed by: NURSE PRACTITIONER

## 2019-01-29 PROCEDURE — 99213 OFFICE O/P EST LOW 20 MIN: CPT | Performed by: NURSE PRACTITIONER

## 2019-01-29 PROCEDURE — G8427 DOCREV CUR MEDS BY ELIG CLIN: HCPCS | Performed by: NURSE PRACTITIONER

## 2019-01-29 PROCEDURE — 1090F PRES/ABSN URINE INCON ASSESS: CPT | Performed by: NURSE PRACTITIONER

## 2019-01-29 RX ORDER — ALBUTEROL SULFATE 90 UG/1
2 AEROSOL, METERED RESPIRATORY (INHALATION) EVERY 4 HOURS PRN
Qty: 1 INHALER | Refills: 1 | Status: SHIPPED | OUTPATIENT
Start: 2019-01-29 | End: 2019-07-02

## 2019-01-29 RX ORDER — BENZONATATE 100 MG/1
100-200 CAPSULE ORAL 3 TIMES DAILY PRN
Qty: 60 CAPSULE | Refills: 0 | Status: SHIPPED | OUTPATIENT
Start: 2019-01-29 | End: 2019-02-05

## 2019-01-29 RX ORDER — AZITHROMYCIN 250 MG/1
250 TABLET, FILM COATED ORAL SEE ADMIN INSTRUCTIONS
Qty: 6 TABLET | Refills: 0 | Status: SHIPPED | OUTPATIENT
Start: 2019-01-29 | End: 2019-02-03

## 2019-01-29 ASSESSMENT — ENCOUNTER SYMPTOMS
SINUS PRESSURE: 1
NAUSEA: 0
RHINORRHEA: 1
TROUBLE SWALLOWING: 0
VOMITING: 0
SINUS PAIN: 0
EYE PAIN: 0
EYE DISCHARGE: 0
SHORTNESS OF BREATH: 0
CHEST TIGHTNESS: 0
WHEEZING: 1
ABDOMINAL PAIN: 0
COUGH: 1
DIARRHEA: 0
SORE THROAT: 1

## 2019-02-25 ENCOUNTER — OFFICE VISIT (OUTPATIENT)
Dept: INTERNAL MEDICINE CLINIC | Age: 64
End: 2019-02-25
Payer: MEDICARE

## 2019-02-25 VITALS
DIASTOLIC BLOOD PRESSURE: 80 MMHG | BODY MASS INDEX: 30.05 KG/M2 | SYSTOLIC BLOOD PRESSURE: 138 MMHG | HEIGHT: 66 IN | WEIGHT: 187 LBS

## 2019-02-25 DIAGNOSIS — J43.1 PANLOBULAR EMPHYSEMA (HCC): ICD-10-CM

## 2019-02-25 DIAGNOSIS — E89.0 POSTOPERATIVE HYPOTHYROIDISM: ICD-10-CM

## 2019-02-25 DIAGNOSIS — E11.9 TYPE 2 DIABETES MELLITUS WITHOUT COMPLICATION, WITHOUT LONG-TERM CURRENT USE OF INSULIN (HCC): ICD-10-CM

## 2019-02-25 DIAGNOSIS — K21.9 GASTROESOPHAGEAL REFLUX DISEASE WITHOUT ESOPHAGITIS: ICD-10-CM

## 2019-02-25 DIAGNOSIS — Z78.0 POST-MENOPAUSAL: Primary | ICD-10-CM

## 2019-02-25 PROCEDURE — G8484 FLU IMMUNIZE NO ADMIN: HCPCS | Performed by: INTERNAL MEDICINE

## 2019-02-25 PROCEDURE — 1090F PRES/ABSN URINE INCON ASSESS: CPT | Performed by: INTERNAL MEDICINE

## 2019-02-25 PROCEDURE — 3046F HEMOGLOBIN A1C LEVEL >9.0%: CPT | Performed by: INTERNAL MEDICINE

## 2019-02-25 PROCEDURE — 2022F DILAT RTA XM EVC RTNOPTHY: CPT | Performed by: INTERNAL MEDICINE

## 2019-02-25 PROCEDURE — 3023F SPIROM DOC REV: CPT | Performed by: INTERNAL MEDICINE

## 2019-02-25 PROCEDURE — G8427 DOCREV CUR MEDS BY ELIG CLIN: HCPCS | Performed by: INTERNAL MEDICINE

## 2019-02-25 PROCEDURE — G8417 CALC BMI ABV UP PARAM F/U: HCPCS | Performed by: INTERNAL MEDICINE

## 2019-02-25 PROCEDURE — 1101F PT FALLS ASSESS-DOCD LE1/YR: CPT | Performed by: INTERNAL MEDICINE

## 2019-02-25 PROCEDURE — 1123F ACP DISCUSS/DSCN MKR DOCD: CPT | Performed by: INTERNAL MEDICINE

## 2019-02-25 PROCEDURE — G8399 PT W/DXA RESULTS DOCUMENT: HCPCS | Performed by: INTERNAL MEDICINE

## 2019-02-25 PROCEDURE — 4004F PT TOBACCO SCREEN RCVD TLK: CPT | Performed by: INTERNAL MEDICINE

## 2019-02-25 PROCEDURE — G8926 SPIRO NO PERF OR DOC: HCPCS | Performed by: INTERNAL MEDICINE

## 2019-02-25 PROCEDURE — 99214 OFFICE O/P EST MOD 30 MIN: CPT | Performed by: INTERNAL MEDICINE

## 2019-02-25 PROCEDURE — 3017F COLORECTAL CA SCREEN DOC REV: CPT | Performed by: INTERNAL MEDICINE

## 2019-02-25 PROCEDURE — 4040F PNEUMOC VAC/ADMIN/RCVD: CPT | Performed by: INTERNAL MEDICINE

## 2019-02-25 RX ORDER — ESTRADIOL 0.07 MG/D
1 FILM, EXTENDED RELEASE TRANSDERMAL
Qty: 8 PATCH | Refills: 3 | Status: SHIPPED | OUTPATIENT
Start: 2019-02-25 | End: 2019-03-18 | Stop reason: SDUPTHER

## 2019-02-25 ASSESSMENT — ENCOUNTER SYMPTOMS: SHORTNESS OF BREATH: 1

## 2019-03-04 ENCOUNTER — TELEPHONE (OUTPATIENT)
Dept: INTERNAL MEDICINE CLINIC | Age: 64
End: 2019-03-04

## 2019-03-18 RX ORDER — ESTRADIOL 0.07 MG/D
1 FILM, EXTENDED RELEASE TRANSDERMAL
Qty: 8 PATCH | Refills: 3 | Status: SHIPPED | OUTPATIENT
Start: 2019-03-18 | End: 2019-11-11 | Stop reason: SDUPTHER

## 2019-05-01 ENCOUNTER — TELEPHONE (OUTPATIENT)
Dept: INTERNAL MEDICINE CLINIC | Age: 64
End: 2019-05-01

## 2019-05-28 ENCOUNTER — OFFICE VISIT (OUTPATIENT)
Dept: INTERNAL MEDICINE CLINIC | Age: 64
End: 2019-05-28
Payer: MEDICARE

## 2019-05-28 VITALS
SYSTOLIC BLOOD PRESSURE: 120 MMHG | HEIGHT: 66 IN | DIASTOLIC BLOOD PRESSURE: 88 MMHG | BODY MASS INDEX: 29.41 KG/M2 | WEIGHT: 183 LBS

## 2019-05-28 DIAGNOSIS — Z12.11 SCREENING FOR COLON CANCER: ICD-10-CM

## 2019-05-28 DIAGNOSIS — Z13.220 SCREENING FOR HYPERLIPIDEMIA: ICD-10-CM

## 2019-05-28 DIAGNOSIS — Z78.0 POST-MENOPAUSAL: ICD-10-CM

## 2019-05-28 DIAGNOSIS — E11.9 TYPE 2 DIABETES MELLITUS WITHOUT COMPLICATION, WITHOUT LONG-TERM CURRENT USE OF INSULIN (HCC): ICD-10-CM

## 2019-05-28 DIAGNOSIS — Z12.39 BREAST CANCER SCREENING, HIGH RISK PATIENT: Primary | ICD-10-CM

## 2019-05-28 PROCEDURE — G8399 PT W/DXA RESULTS DOCUMENT: HCPCS | Performed by: INTERNAL MEDICINE

## 2019-05-28 PROCEDURE — 4004F PT TOBACCO SCREEN RCVD TLK: CPT | Performed by: INTERNAL MEDICINE

## 2019-05-28 PROCEDURE — G8417 CALC BMI ABV UP PARAM F/U: HCPCS | Performed by: INTERNAL MEDICINE

## 2019-05-28 PROCEDURE — 3046F HEMOGLOBIN A1C LEVEL >9.0%: CPT | Performed by: INTERNAL MEDICINE

## 2019-05-28 PROCEDURE — 4040F PNEUMOC VAC/ADMIN/RCVD: CPT | Performed by: INTERNAL MEDICINE

## 2019-05-28 PROCEDURE — 3017F COLORECTAL CA SCREEN DOC REV: CPT | Performed by: INTERNAL MEDICINE

## 2019-05-28 PROCEDURE — 2022F DILAT RTA XM EVC RTNOPTHY: CPT | Performed by: INTERNAL MEDICINE

## 2019-05-28 PROCEDURE — 99214 OFFICE O/P EST MOD 30 MIN: CPT | Performed by: INTERNAL MEDICINE

## 2019-05-28 PROCEDURE — 1123F ACP DISCUSS/DSCN MKR DOCD: CPT | Performed by: INTERNAL MEDICINE

## 2019-05-28 PROCEDURE — 1090F PRES/ABSN URINE INCON ASSESS: CPT | Performed by: INTERNAL MEDICINE

## 2019-05-28 PROCEDURE — G8427 DOCREV CUR MEDS BY ELIG CLIN: HCPCS | Performed by: INTERNAL MEDICINE

## 2019-05-28 ASSESSMENT — ENCOUNTER SYMPTOMS
WHEEZING: 0
PHOTOPHOBIA: 0
SINUS PRESSURE: 0
BACK PAIN: 0
RHINORRHEA: 0
APNEA: 0
ABDOMINAL DISTENTION: 0
EYE REDNESS: 0
ABDOMINAL PAIN: 0
VOMITING: 0
SHORTNESS OF BREATH: 0
FACIAL SWELLING: 0
CHEST TIGHTNESS: 0
VOICE CHANGE: 0
CONSTIPATION: 0
EYE DISCHARGE: 0
COLOR CHANGE: 0
BLOOD IN STOOL: 0
RECTAL PAIN: 0
TROUBLE SWALLOWING: 0
STRIDOR: 0
COUGH: 0
SORE THROAT: 0
DIARRHEA: 0
ANAL BLEEDING: 0
NAUSEA: 0
CHOKING: 0
EYE ITCHING: 0
EYE PAIN: 0

## 2019-05-28 NOTE — PROGRESS NOTES
Chronic Disease Visit Information    BP Readings from Last 3 Encounters:   02/25/19 138/80   01/29/19 122/80   01/08/19 (!) 144/70          Hemoglobin A1C (%)   Date Value   04/09/2018 6.1 (H)   04/17/2017 6.4   10/21/2016 6.6 (H)     Microalb/Crt. Ratio (mcg/mg creat)   Date Value   04/09/2018 7     LDL Cholesterol (mg/dL)   Date Value   04/09/2018 84     HDL (mg/dL)   Date Value   04/09/2018 61     BUN (mg/dL)   Date Value   04/09/2018 13     CREATININE (mg/dL)   Date Value   04/09/2018 0.68     Glucose (mg/dL)   Date Value   04/09/2018 122 (H)   03/09/2012 103            Have you changed or started any medications since your last visit including any over-the-counter medicines, vitamins, or herbal medicines? no   Are you having any side effects from any of your medications? -  no  Have you stopped taking any of your medications? Is so, why? -  no    Have you seen any other physician or provider since your last visit? No  Have you had any other diagnostic tests since your last visit? No  Have you been seen in the emergency room and/or had an admission to a hospital since we last saw you? No  Have you had your annual diabetic retinal (eye) exam? No  Have you had your routine dental cleaning in the past 6 months? no    Have you activated your [employer] account? If not, what are your barriers?  Yes     Patient Care Team:  Randi Haddad MD as PCP - Ronaldo Gallagher MD as PCP - S Attributed Provider         Medical History Review  Past Medical, Family, and Social History reviewed and does contribute to the patient presenting condition  Chief Complaint   Patient presents with    Diabetes     due for blood work - hypoglycemic episode     COPD     baseline sob      Health Maintenance   Topic Date Due    Hepatitis C screen  1955    HIV screen  12/24/1968    DTaP/Tdap/Td vaccine (1 - Tdap) 12/24/1972    Shingles Vaccine (1 of 2) 12/24/2003    Colon cancer screen colonoscopy  12/24/2003    Low dose CT

## 2019-05-28 NOTE — PROGRESS NOTES
Subjective:      Jelani Lino is a 70 y.o. female who presents today for follow up and management of her chronic medical conditions as noted below.       HPI:    Jelani Lino is c/o of   Chief Complaint   Patient presents with    Diabetes     due for blood work - hypoglycemic episode     COPD     baseline sob    .   hypoglycemia   no sweating    shakey  Past Medical History:   Diagnosis Date    Allergic rhinitis, cause unspecified     Asymptomatic varicose veins     Calculus of kidney     [facility] obstructive [facility] disease) [facility] [address] [facility]     Depression     Disorders of sacrum     Dizziness and giddiness     DJD (degenerative joint disease)     GERD (gastroesophageal reflux disease)     Hepatitis     Hypercalcemia     Hyperparathyroidism, unspecified ([facility] [address]     Hypertension     Myalgia and myositis, unspecified     Nontoxic uninodular goiter     Osteoarthrosis, unspecified whether generalized or localized, shoulder region     Other malaise and fatigue     Other premature beats     Postsurgical hypothyroidism     Symptomatic menopausal or female climacteric states     Type II or unspecified type diabetes mellitus without mention of complication, not stated as uncontrolled     Urticaria, unspecified     Vitamin D deficiency        Past Surgical History:   Procedure Laterality Date    HYSTERECTOMY      PARATHYROIDECTOMY         Family History   Problem Relation Age of Onset    Heart Disease Mother     High Cholesterol Mother     Heart Disease Father     High Cholesterol Father     Cancer Brother         mouth       Social History     Socioeconomic History    Marital status:      Spouse name: None    Number of children: None    Years of education: None    Highest education level: None   Occupational History    None   Social Needs    Financial resource strain: None    Food insecurity:     Worry: None     Inability: None    Transportation needs:     Medical: None Non-medical: None   Tobacco Use    Smoking status: Current Every Day Smoker     Packs/day: 1.00     Years: 50.00     Pack years: 50.00     Types: Cigarettes    Smokeless tobacco: Never Used   Substance and Sexual Activity    Alcohol use: No     Alcohol/week: 0.0 oz    Drug use: No    Sexual activity: None   Lifestyle    Physical activity:     Days per week: None     Minutes per session: None    Stress: None   Relationships    Social connections:     Talks on phone: None     Gets together: None     Attends Judaism service: None     Active member of club or organization: None     Attends meetings of clubs or organizations: None     Relationship status: None    Intimate partner violence:     Fear of current or ex partner: None     Emotionally abused: None     Physically abused: None     Forced sexual activity: None   Other Topics Concern    None   Social History Narrative    None       Current Outpatient Medications   Medication Sig Dispense Refill    estradiol (JEANNE) 0.075 MG/24HR Place 1 patch onto the skin every 7 days 8 patch 3    albuterol sulfate HFA (VENTOLIN HFA) 108 (90 Base) MCG/ACT inhaler Inhale 2 puffs into the lungs every 4 hours as needed for Wheezing or Shortness of Breath 1 Inhaler 1    fluticasone (FLONASE) 50 MCG/ACT nasal spray 2 sprays by Nasal route daily 3 Bottle 3    levothyroxine (SYNTHROID) 125 MCG tablet Take 1 tablet by mouth daily 90 tablet 3    esomeprazole (NEXIUM) 40 MG delayed release capsule Take 1 capsule by mouth every morning (before breakfast) 90 capsule 3    sodium chloride (OCEAN, BABY AYR) 0.65 % nasal spray 1 spray by Nasal route as needed for Congestion      Vitamin D (CHOLECALCIFEROL) 1000 UNITS CAPS capsule Take 1,000 Units by mouth daily.  cetirizine (ZYRTEC) 10 MG tablet Take 10 mg by mouth daily.  fexofenadine (ALLEGRA ALLERGY) 180 MG tablet Take 180 mg by mouth daily. No current facility-administered medications for this visit. Review of Systems:    Review of Systems   Constitutional: Negative for activity change, appetite change, chills, diaphoresis, fatigue and fever. HENT: Negative for congestion, dental problem, drooling, ear discharge, ear pain, facial swelling, hearing loss, mouth sores, nosebleeds, postnasal drip, rhinorrhea, sinus pressure, sneezing, sore throat, tinnitus, trouble swallowing and voice change. Eyes: Negative for photophobia, pain, discharge, redness, itching and visual disturbance. Respiratory: Negative for apnea, cough, choking, chest tightness, shortness of breath, wheezing and stridor. Cardiovascular: Negative for chest pain, palpitations and leg swelling. Gastrointestinal: Negative for abdominal distention, abdominal pain, anal bleeding, blood in stool, constipation, diarrhea, nausea, rectal pain and vomiting. Endocrine: Negative for cold intolerance, heat intolerance, polydipsia, polyphagia and polyuria. Genitourinary: Negative for decreased urine volume, difficulty urinating, dyspareunia, dysuria, enuresis, flank pain, frequency, genital sores, hematuria, menstrual problem, pelvic pain, urgency, vaginal bleeding and vaginal discharge. Musculoskeletal: Negative for arthralgias, back pain, gait problem, joint swelling, myalgias, neck pain and neck stiffness. Skin: Negative for color change, pallor, rash and wound. Neurological: Positive for weakness. Negative for dizziness, tremors, seizures, syncope, facial asymmetry, speech difficulty, light-headedness, numbness and headaches. Hematological: Negative for adenopathy. Does not bruise/bleed easily. Psychiatric/Behavioral: Positive for sleep disturbance. Negative for agitation, behavioral problems, confusion, decreased concentration and dysphoric mood. The patient is not nervous/anxious. Objective:     PhysicalExam:      Physical Exam   Constitutional: She is oriented to person, place, and time.  She appears well-developed and well-nourished. No distress. HENT:   Head: Normocephalic and atraumatic. Right Ear: External ear normal.   Left Ear: External ear normal.   Nose: Nose normal.   Mouth/Throat: Oropharynx is clear and moist. No oropharyngeal exudate. Eyes: Pupils are equal, round, and reactive to light. Conjunctivae and EOM are normal. Right eye exhibits no discharge. Left eye exhibits no discharge. No scleral icterus. Neck: Normal range of motion. Neck supple. No JVD present. No tracheal deviation present. No thyromegaly present. Cardiovascular: Normal rate, regular rhythm, normal heart sounds and intact distal pulses. Exam reveals no gallop and no friction rub. No murmur heard. Pulmonary/Chest: Effort normal and breath sounds normal. No respiratory distress. She has no wheezes. She has no rales. She exhibits no tenderness. Abdominal: Soft. Bowel sounds are normal. She exhibits no distension and no mass. There is no tenderness. There is no rebound and no guarding. Genitourinary: Rectal exam shows guaiac negative stool. No vaginal discharge found. Musculoskeletal: She exhibits no edema or tenderness. Lymphadenopathy:     She has no cervical adenopathy. Neurological: She is alert and oriented to person, place, and time. She has normal reflexes. She displays normal reflexes. No cranial nerve deficit. She exhibits normal muscle tone. Coordination normal.   Skin: Skin is warm and dry. No rash noted. She is not diaphoretic. No erythema. No pallor. Psychiatric: She has a normal mood and affect. Her behavior is normal. Judgment and thought content normal.         Results for orders placed or performed during the hospital encounter of 10/26/18   TSH without Reflex   Result Value Ref Range    TSH 0.10 (L) 0.30 - 5.00 mIU/L     No results found. Assessment:      Diagnosis Orders   1. Breast cancer screening, high risk patient  DAKOTA DIGITAL SCREEN W OR WO CAD BILATERAL   2.  Post-menopausal  DEXA Bone Density Axial Skeleton   3. Screening for colon cancer  POCT FECAL IMMUNOCHEMICAL TEST (FIT)   4. Screening for hyperlipidemia  At goal   recheck   5. Type 2 diabetes mellitus without complication, without long-term current use of insulin ([facility])   DIABETES FOOT EXAM 6.1  No meds low bs  No meds    Insulin, Total    hypoglycemia  No meds lft nl check insulin level haopped occasionally 3/mo  After exercise         Plan:     Orders Placed This Encounter   Procedures    DEXA Bone Density Axial Skeleton     Standing Status:   Future     Standing Expiration Date:   5/28/2020     Order Specific Question:   Reason for exam:     Answer:   annual screening    DAKOTA DIGITAL SCREEN W OR WO CAD BILATERAL     Standing Status:   Future     Standing Expiration Date:   7/28/2020     Order Specific Question:   Reason for exam:     Answer:   annual screening    Insulin, Total     Standing Status:   Future     Standing Expiration Date:   5/28/2020    POCT FECAL IMMUNOCHEMICAL TEST (FIT)     Standing Status:   Future     Standing Expiration Date:   5/28/2020     DIABETES FOOT EXAM       No orders of the defined types were placed in this encounter. insulin level     Labs         Rogelio Chavez received counseling on the following healthy behaviors: nutrition, exercise and medication adherence  Reviewed prior labs and health maintenance. Continue current medications, diet and exercise. Discussed use, benefit, and side effects of prescribed medications. Barriers to medication compliance addressed. Patient given educational materials - see patient instructions. All patient questions answered. Patient voiced understanding. 1.  Patient given educational materials - see patient instructions  2. Discussed use, benefit, and side effects of prescribed medications. Barriers to medication compliance addressed. All patient questions answered. Pt voiced understanding. 3.  Reviewed prior labs and health maintenance  4. Continue current medications, diet and exercise.   5.  4 weeks

## 2019-06-18 ENCOUNTER — HOSPITAL ENCOUNTER (OUTPATIENT)
Dept: WOMENS IMAGING | Age: 64
Discharge: HOME | End: 2019-06-20
Payer: MEDICARE

## 2019-06-18 ENCOUNTER — HOSPITAL ENCOUNTER (OUTPATIENT)
Age: 64
Discharge: HOME | End: 2019-06-18
Payer: MEDICARE

## 2019-06-18 DIAGNOSIS — Z78.0 POST-MENOPAUSAL: ICD-10-CM

## 2019-06-18 DIAGNOSIS — Z12.39 BREAST CANCER SCREENING, HIGH RISK PATIENT: ICD-10-CM

## 2019-06-18 DIAGNOSIS — J43.1 PANLOBULAR EMPHYSEMA (HCC): ICD-10-CM

## 2019-06-18 DIAGNOSIS — E89.0 POSTOPERATIVE HYPOTHYROIDISM: ICD-10-CM

## 2019-06-18 DIAGNOSIS — E11.9 TYPE 2 DIABETES MELLITUS WITHOUT COMPLICATION, WITHOUT LONG-TERM CURRENT USE OF INSULIN (HCC): ICD-10-CM

## 2019-06-18 LAB
-: ABNORMAL
ABSOLUTE EOS #: 0.1 K/UL (ref 0–0.4)
ABSOLUTE IMMATURE GRANULOCYTE: ABNORMAL K/UL (ref 0–0.3)
ABSOLUTE LYMPH #: 1.7 K/UL (ref 1–4.8)
ABSOLUTE MONO #: 0.4 K/UL (ref 0.1–1.3)
ALBUMIN SERPL-MCNC: 4.4 G/DL (ref 3.5–5.2)
ALBUMIN/GLOBULIN RATIO: ABNORMAL (ref 1–2.5)
ALP BLD-CCNC: 88 U/L (ref 35–104)
ALT SERPL-CCNC: 40 U/L (ref 5–33)
AMORPHOUS: ABNORMAL
ANION GAP SERPL CALCULATED.3IONS-SCNC: 10 MMOL/L (ref 9–17)
AST SERPL-CCNC: 28 U/L
BACTERIA: ABNORMAL
BASOPHILS # BLD: 1 % (ref 0–2)
BASOPHILS ABSOLUTE: 0 K/UL (ref 0–0.2)
BILIRUB SERPL-MCNC: 0.52 MG/DL (ref 0.3–1.2)
BILIRUBIN URINE: NEGATIVE
BUN BLDV-MCNC: 13 MG/DL (ref 8–23)
BUN/CREAT BLD: ABNORMAL (ref 9–20)
CALCIUM SERPL-MCNC: 9.3 MG/DL (ref 8.6–10.4)
CASTS UA: ABNORMAL /LPF
CHLORIDE BLD-SCNC: 102 MMOL/L (ref 98–107)
CHOLESTEROL/HDL RATIO: 2.9
CHOLESTEROL: 175 MG/DL
CO2: 28 MMOL/L (ref 20–31)
COLOR: YELLOW
COMMENT UA: ABNORMAL
CREAT SERPL-MCNC: 0.78 MG/DL (ref 0.5–0.9)
CRYSTALS, UA: ABNORMAL /HPF
DIFFERENTIAL TYPE: ABNORMAL
EOSINOPHILS RELATIVE PERCENT: 2 % (ref 0–4)
EPITHELIAL CELLS UA: ABNORMAL /HPF
ESTIMATED AVERAGE GLUCOSE: 143 MG/DL
GFR AFRICAN AMERICAN: >60 ML/MIN
GFR NON-AFRICAN AMERICAN: >60 ML/MIN
GFR SERPL CREATININE-BSD FRML MDRD: ABNORMAL ML/MIN/{1.73_M2}
GFR SERPL CREATININE-BSD FRML MDRD: ABNORMAL ML/MIN/{1.73_M2}
GLUCOSE BLD-MCNC: 125 MG/DL (ref 70–99)
GLUCOSE URINE: NEGATIVE
HBA1C MFR BLD: 6.6 % (ref 4–6)
HCT VFR BLD CALC: 48.8 % (ref 36–46)
HDLC SERPL-MCNC: 60 MG/DL
HEMOGLOBIN: 16.5 G/DL (ref 12–16)
IMMATURE GRANULOCYTES: ABNORMAL %
INSULIN COMMENT: NORMAL
INSULIN REFERENCE RANGE:: NORMAL
INSULIN: 21 MU/L
KETONES, URINE: NEGATIVE
LDL CHOLESTEROL: 81 MG/DL (ref 0–130)
LEUKOCYTE ESTERASE, URINE: NEGATIVE
LYMPHOCYTES # BLD: 30 % (ref 24–44)
MCH RBC QN AUTO: 32 PG (ref 26–34)
MCHC RBC AUTO-ENTMCNC: 33.7 G/DL (ref 31–37)
MCV RBC AUTO: 94.9 FL (ref 80–100)
MONOCYTES # BLD: 8 % (ref 1–7)
MUCUS: ABNORMAL
NITRITE, URINE: NEGATIVE
NRBC AUTOMATED: ABNORMAL PER 100 WBC
OTHER OBSERVATIONS UA: ABNORMAL
PDW BLD-RTO: 12.9 % (ref 11.5–14.9)
PH UA: 7.5 (ref 5–8)
PLATELET # BLD: 234 K/UL (ref 150–450)
PLATELET ESTIMATE: ABNORMAL
PMV BLD AUTO: 9 FL (ref 6–12)
POTASSIUM SERPL-SCNC: 4.4 MMOL/L (ref 3.7–5.3)
PROTEIN UA: NEGATIVE
RBC # BLD: 5.14 M/UL (ref 4–5.2)
RBC # BLD: ABNORMAL 10*6/UL
RBC UA: ABNORMAL /HPF
RENAL EPITHELIAL, UA: ABNORMAL /HPF
SEG NEUTROPHILS: 59 % (ref 36–66)
SEGMENTED NEUTROPHILS ABSOLUTE COUNT: 3.4 K/UL (ref 1.3–9.1)
SODIUM BLD-SCNC: 140 MMOL/L (ref 135–144)
SPECIFIC GRAVITY UA: 1.02 (ref 1–1.03)
TOTAL PROTEIN: 7.6 G/DL (ref 6.4–8.3)
TRICHOMONAS: ABNORMAL
TRIGL SERPL-MCNC: 172 MG/DL
TSH SERPL DL<=0.05 MIU/L-ACNC: 0.11 MIU/L (ref 0.3–5)
TURBIDITY: ABNORMAL
URINE HGB: NEGATIVE
UROBILINOGEN, URINE: NORMAL
VLDLC SERPL CALC-MCNC: ABNORMAL MG/DL (ref 1–30)
WBC # BLD: 5.6 K/UL (ref 3.5–11)
WBC # BLD: ABNORMAL 10*3/UL
WBC UA: ABNORMAL /HPF
YEAST: ABNORMAL

## 2019-06-18 PROCEDURE — 83036 HEMOGLOBIN GLYCOSYLATED A1C: CPT

## 2019-06-18 PROCEDURE — 81001 URINALYSIS AUTO W/SCOPE: CPT

## 2019-06-18 PROCEDURE — 83525 ASSAY OF INSULIN: CPT

## 2019-06-18 PROCEDURE — 84443 ASSAY THYROID STIM HORMONE: CPT

## 2019-06-18 PROCEDURE — 80053 COMPREHEN METABOLIC PANEL: CPT

## 2019-06-18 PROCEDURE — 36415 COLL VENOUS BLD VENIPUNCTURE: CPT

## 2019-06-18 PROCEDURE — 77063 BREAST TOMOSYNTHESIS BI: CPT

## 2019-06-18 PROCEDURE — 85025 COMPLETE CBC W/AUTO DIFF WBC: CPT

## 2019-06-18 PROCEDURE — 80061 LIPID PANEL: CPT

## 2019-06-18 PROCEDURE — 77080 DXA BONE DENSITY AXIAL: CPT

## 2019-07-02 ENCOUNTER — OFFICE VISIT (OUTPATIENT)
Dept: INTERNAL MEDICINE CLINIC | Age: 64
End: 2019-07-02
Payer: MEDICARE

## 2019-07-02 VITALS
WEIGHT: 184.2 LBS | OXYGEN SATURATION: 97 % | SYSTOLIC BLOOD PRESSURE: 138 MMHG | HEIGHT: 66 IN | DIASTOLIC BLOOD PRESSURE: 72 MMHG | BODY MASS INDEX: 29.6 KG/M2 | HEART RATE: 82 BPM

## 2019-07-02 DIAGNOSIS — E16.2 HYPOGLYCEMIA: ICD-10-CM

## 2019-07-02 DIAGNOSIS — E11.9 TYPE 2 DIABETES MELLITUS WITHOUT COMPLICATION, WITHOUT LONG-TERM CURRENT USE OF INSULIN (HCC): Primary | ICD-10-CM

## 2019-07-02 DIAGNOSIS — K21.9 GASTROESOPHAGEAL REFLUX DISEASE WITHOUT ESOPHAGITIS: ICD-10-CM

## 2019-07-02 DIAGNOSIS — J43.1 PANLOBULAR EMPHYSEMA (HCC): ICD-10-CM

## 2019-07-02 PROCEDURE — 4040F PNEUMOC VAC/ADMIN/RCVD: CPT | Performed by: INTERNAL MEDICINE

## 2019-07-02 PROCEDURE — 1090F PRES/ABSN URINE INCON ASSESS: CPT | Performed by: INTERNAL MEDICINE

## 2019-07-02 PROCEDURE — G8399 PT W/DXA RESULTS DOCUMENT: HCPCS | Performed by: INTERNAL MEDICINE

## 2019-07-02 PROCEDURE — G8926 SPIRO NO PERF OR DOC: HCPCS | Performed by: INTERNAL MEDICINE

## 2019-07-02 PROCEDURE — 99214 OFFICE O/P EST MOD 30 MIN: CPT | Performed by: INTERNAL MEDICINE

## 2019-07-02 PROCEDURE — 3017F COLORECTAL CA SCREEN DOC REV: CPT | Performed by: INTERNAL MEDICINE

## 2019-07-02 PROCEDURE — 1123F ACP DISCUSS/DSCN MKR DOCD: CPT | Performed by: INTERNAL MEDICINE

## 2019-07-02 PROCEDURE — 2022F DILAT RTA XM EVC RTNOPTHY: CPT | Performed by: INTERNAL MEDICINE

## 2019-07-02 PROCEDURE — G8417 CALC BMI ABV UP PARAM F/U: HCPCS | Performed by: INTERNAL MEDICINE

## 2019-07-02 PROCEDURE — G8427 DOCREV CUR MEDS BY ELIG CLIN: HCPCS | Performed by: INTERNAL MEDICINE

## 2019-07-02 PROCEDURE — 3023F SPIROM DOC REV: CPT | Performed by: INTERNAL MEDICINE

## 2019-07-02 PROCEDURE — 4004F PT TOBACCO SCREEN RCVD TLK: CPT | Performed by: INTERNAL MEDICINE

## 2019-07-02 PROCEDURE — 3044F HG A1C LEVEL LT 7.0%: CPT | Performed by: INTERNAL MEDICINE

## 2019-07-02 ASSESSMENT — ENCOUNTER SYMPTOMS
DIARRHEA: 0
CHOKING: 0
COLOR CHANGE: 0
APNEA: 0
COUGH: 0
FACIAL SWELLING: 0
ABDOMINAL PAIN: 0
VOMITING: 0
EYE PAIN: 0
SINUS PRESSURE: 0
CHEST TIGHTNESS: 0
ABDOMINAL DISTENTION: 0
EYE ITCHING: 0
RHINORRHEA: 0
SHORTNESS OF BREATH: 0
STRIDOR: 0
WHEEZING: 0
NAUSEA: 0
PHOTOPHOBIA: 0
ANAL BLEEDING: 0
CONSTIPATION: 0
EYE REDNESS: 0
SORE THROAT: 0
VOICE CHANGE: 0
BACK PAIN: 0
TROUBLE SWALLOWING: 0
BLOOD IN STOOL: 0
EYE DISCHARGE: 0
RECTAL PAIN: 0

## 2019-07-02 NOTE — PROGRESS NOTES
TSH without Reflex   Result Value Ref Range    TSH 0.11 (L) 0.30 - 5.00 mIU/L   Lipid Panel   Result Value Ref Range    Cholesterol 175 <200 mg/dL    HDL 60 >40 mg/dL    LDL Cholesterol 81 0 - 130 mg/dL    Chol/HDL Ratio 2.9 <5    Triglycerides 172 (H) <150 mg/dL    VLDL NOT REPORTED (H) 1 - 30 mg/dL   Microscopic Urinalysis   Result Value Ref Range    -          WBC, UA 0 TO 2 /HPF    RBC, UA 0 TO 2 /HPF    Casts UA NOT REPORTED /LPF    Crystals UA NOT REPORTED None /HPF    Epithelial Cells UA 2 TO 5 /HPF    Renal Epithelial, Urine NOT REPORTED 0 /HPF    Bacteria, UA MODERATE (A) None    Mucus, UA 1+ (A) None    Trichomonas, UA NOT REPORTED None    Amorphous, UA NOT REPORTED None    Other Observations UA NOT REPORTED NOT REQ. Yeast, UA NOT REPORTED None     No results found. Assessment:      Diagnosis Orders   1. Type 2 diabetes mellitus without complication, without long-term current use of insulin ([facility])   hba1c 6.6  No meds has hypoglycemia issues    2. Hypoglycemia   ? Cause   W/u was neg      No insuliinoma   Advice diet adjustments   3. Panlobular emphysema ([facility] [address]  Baseline     4. Gastroesophageal reflux disease without esophagitis    Stable cont ppi         Plan:          Dm education      Long acting sugars      Labs revieved     chol nl    lft nl   insulin level nl  21       Ayala received counseling on the following healthy behaviors: nutrition  Reviewed prior labs and health maintenance. Continue current medications, diet and exercise. Discussed use, benefit, and side effects of prescribed medications. Barriers to medication compliance addressed. Patient given educational materials - see patient instructions. All patient questions answered. Patient voiced understanding. 1.  Patient given educational materials - see patient instructions  2. Discussed use, benefit, and side effects of prescribed medications. Barriers to medication compliance addressed.   All patient questions

## 2019-10-03 ENCOUNTER — TELEPHONE (OUTPATIENT)
Dept: INTERNAL MEDICINE CLINIC | Age: 64
End: 2019-10-03

## 2019-10-03 ENCOUNTER — OFFICE VISIT (OUTPATIENT)
Dept: INTERNAL MEDICINE CLINIC | Age: 64
End: 2019-10-03
Payer: MEDICARE

## 2019-10-03 VITALS
SYSTOLIC BLOOD PRESSURE: 170 MMHG | WEIGHT: 183 LBS | BODY MASS INDEX: 29.41 KG/M2 | HEIGHT: 66 IN | DIASTOLIC BLOOD PRESSURE: 72 MMHG

## 2019-10-03 DIAGNOSIS — E89.0 POSTSURGICAL HYPOTHYROIDISM: ICD-10-CM

## 2019-10-03 DIAGNOSIS — R10.13 EPIGASTRIC PAIN: ICD-10-CM

## 2019-10-03 DIAGNOSIS — E11.9 TYPE 2 DIABETES MELLITUS WITHOUT COMPLICATION, WITHOUT LONG-TERM CURRENT USE OF INSULIN (HCC): Primary | ICD-10-CM

## 2019-10-03 PROCEDURE — 1123F ACP DISCUSS/DSCN MKR DOCD: CPT | Performed by: INTERNAL MEDICINE

## 2019-10-03 PROCEDURE — 4040F PNEUMOC VAC/ADMIN/RCVD: CPT | Performed by: INTERNAL MEDICINE

## 2019-10-03 PROCEDURE — G8417 CALC BMI ABV UP PARAM F/U: HCPCS | Performed by: INTERNAL MEDICINE

## 2019-10-03 PROCEDURE — 2022F DILAT RTA XM EVC RTNOPTHY: CPT | Performed by: INTERNAL MEDICINE

## 2019-10-03 PROCEDURE — 4004F PT TOBACCO SCREEN RCVD TLK: CPT | Performed by: INTERNAL MEDICINE

## 2019-10-03 PROCEDURE — 3044F HG A1C LEVEL LT 7.0%: CPT | Performed by: INTERNAL MEDICINE

## 2019-10-03 PROCEDURE — 1090F PRES/ABSN URINE INCON ASSESS: CPT | Performed by: INTERNAL MEDICINE

## 2019-10-03 PROCEDURE — G8399 PT W/DXA RESULTS DOCUMENT: HCPCS | Performed by: INTERNAL MEDICINE

## 2019-10-03 PROCEDURE — 99214 OFFICE O/P EST MOD 30 MIN: CPT | Performed by: INTERNAL MEDICINE

## 2019-10-03 PROCEDURE — G8427 DOCREV CUR MEDS BY ELIG CLIN: HCPCS | Performed by: INTERNAL MEDICINE

## 2019-10-03 PROCEDURE — 3017F COLORECTAL CA SCREEN DOC REV: CPT | Performed by: INTERNAL MEDICINE

## 2019-10-03 PROCEDURE — G8484 FLU IMMUNIZE NO ADMIN: HCPCS | Performed by: INTERNAL MEDICINE

## 2019-10-03 RX ORDER — PANTOPRAZOLE SODIUM 20 MG/1
40 TABLET, DELAYED RELEASE ORAL DAILY
Qty: 30 TABLET | Refills: 11 | Status: SHIPPED | OUTPATIENT
Start: 2019-10-03

## 2019-10-03 RX ORDER — SUCRALFATE 1 G/1
1 TABLET ORAL 4 TIMES DAILY
Qty: 120 TABLET | Refills: 3 | Status: SHIPPED | OUTPATIENT
Start: 2019-10-03

## 2019-10-03 ASSESSMENT — ENCOUNTER SYMPTOMS
STRIDOR: 0
EYE PAIN: 0
RECTAL PAIN: 0
BACK PAIN: 0
VOMITING: 0
EYE ITCHING: 0
SINUS PRESSURE: 0
NAUSEA: 0
COUGH: 0
ANAL BLEEDING: 0
VOICE CHANGE: 0
CHEST TIGHTNESS: 0
CONSTIPATION: 0
DIARRHEA: 0
FACIAL SWELLING: 0
BLOOD IN STOOL: 0
ABDOMINAL PAIN: 1
ABDOMINAL DISTENTION: 0
COLOR CHANGE: 0
RHINORRHEA: 0
CHOKING: 0
APNEA: 0
SORE THROAT: 0
WHEEZING: 0
PHOTOPHOBIA: 0
SHORTNESS OF BREATH: 0
EYE REDNESS: 0
EYE DISCHARGE: 0
TROUBLE SWALLOWING: 0

## 2019-10-07 ENCOUNTER — HOSPITAL ENCOUNTER (OUTPATIENT)
Age: 64
Setting detail: SPECIMEN
Discharge: HOME | End: 2019-10-07
Payer: MEDICARE

## 2019-10-08 LAB
ALBUMIN SERPL-MCNC: 4 G/DL (ref 3.5–5.2)
ALBUMIN/GLOBULIN RATIO: 1.7 (ref 1–2.5)
ALP BLD-CCNC: 85 U/L (ref 35–104)
ALT SERPL-CCNC: 36 U/L (ref 5–33)
AMYLASE: 33 U/L (ref 28–100)
AST SERPL-CCNC: 31 U/L
BILIRUB SERPL-MCNC: 0.23 MG/DL (ref 0.3–1.2)
BILIRUBIN DIRECT: 0.1 MG/DL
BILIRUBIN, INDIRECT: 0.13 MG/DL (ref 0–1)
GLOBULIN: ABNORMAL G/DL (ref 1.5–3.8)
LIPASE: 38 U/L (ref 13–60)
TOTAL PROTEIN: 6.3 G/DL (ref 6.4–8.3)

## 2019-11-11 RX ORDER — ESTRADIOL 0.07 MG/D
1 FILM, EXTENDED RELEASE TRANSDERMAL
Qty: 4 PATCH | Refills: 0 | Status: SHIPPED | OUTPATIENT
Start: 2019-11-11

## 2020-07-20 ENCOUNTER — HOSPITAL ENCOUNTER (OUTPATIENT)
Dept: WOMENS IMAGING | Age: 65
Discharge: HOME | End: 2020-07-22
Payer: MEDICARE

## 2020-07-20 ENCOUNTER — HOSPITAL ENCOUNTER (OUTPATIENT)
Age: 65
Setting detail: SPECIMEN
Discharge: HOME | End: 2020-07-20
Payer: MEDICARE

## 2020-07-20 PROCEDURE — 77063 BREAST TOMOSYNTHESIS BI: CPT

## 2020-07-21 LAB
DHEAS (DHEA SULFATE): 49 UG/DL (ref 13–130)
ESTIMATED AVERAGE GLUCOSE: 154 MG/DL
HBA1C MFR BLD: 7 % (ref 4–6)
T3 FREE: 2.63 PG/ML (ref 2.02–4.43)
TESTOSTERONE TOTAL: 14 NG/DL (ref 20–70)
THYROGLOBULIN AB: <20 IU/ML (ref 0–40)
THYROID PEROXIDASE (TPO) AB: 14.6 IU/ML (ref 0–35)
THYROXINE, FREE: 1.59 NG/DL (ref 0.93–1.7)
TSH SERPL DL<=0.05 MIU/L-ACNC: 0.14 MIU/L (ref 0.3–5)
VITAMIN D 25-HYDROXY: 52.2 NG/ML (ref 30–100)

## 2020-08-14 ENCOUNTER — HOSPITAL ENCOUNTER (OUTPATIENT)
Dept: DIABETES SERVICES | Age: 65
Setting detail: THERAPIES SERIES
Discharge: HOME | End: 2020-08-14
Payer: MEDICARE

## 2020-08-14 PROCEDURE — G0108 DIAB MANAGE TRN  PER INDIV: HCPCS

## 2020-08-14 SDOH — ECONOMIC STABILITY: FOOD INSECURITY: ADDITIONAL INFORMATION: NO

## 2020-08-14 ASSESSMENT — PROBLEM AREAS IN DIABETES QUESTIONNAIRE (PAID)
FEELING DEPRESSED WHEN YOU THINK ABOUT LIVING WITH DIABETES: 0
PAID-5 TOTAL SCORE: 1
WORRYING ABOUT THE FUTURE AND THE POSSIBILITY OF SERIOUS COMPLICATIONS: 1
COPING WITH COMPLICATIONS OF DIABETES: 0
FEELING SCARED WHEN YOU THINK ABOUT LIVING WITH DIABETES: 0
FEELING THAT DIABETES IS TAKING UP TOO MUCH OF YOUR MENTAL AND PHYSICAL ENERGY EVERY DAY: 0

## 2020-08-14 NOTE — LETTER
[facility]  [address]  [facility] [address]  Phone: [phone]         August 18, 2020    To: Dr. Celena Martinez  From: Adilia Hoang RD, LD    Patient: Rosalia Lino   YOB: 1955   Date of Visit: 8/14/20      An initial assessment to determine diabetes education needs was completed via [employer] me/Telephone on 8/14/20. Education plan includes:interpretation of lab results, blood sugar goals, complications of diabetes mellitus, hypoglycemia prevention and treatment, exercise, illness management, self-monitoring of blood glucose skills, nutrition, carbohydrate counting and pathophysiology of Diabetes. .    An ongoing plan was created to include: follow up individually d/t COVID-19 and no currently group sessions. Thank you for the opportunity to provide Diabetes Self Management Education to your patient.

## 2020-08-18 ENCOUNTER — HOSPITAL ENCOUNTER (OUTPATIENT)
Dept: DIABETES SERVICES | Age: 65
Setting detail: THERAPIES SERIES
Discharge: HOME | End: 2020-08-18
Payer: MEDICARE

## 2020-08-18 PROCEDURE — G0108 DIAB MANAGE TRN  PER INDIV: HCPCS

## 2020-08-18 NOTE — PROGRESS NOTES
LABA1C 7.0 (H) 07/20/2020    LABA1C 6.6 (H) 06/18/2019    LABA1C 6.1 (H) 04/09/2018     Lab Results   Component Value Date    LABMICR 7 04/09/2018    CREATININE 0.78 06/18/2019       Blood pressure   BP Readings from Last 3 Encounters:   10/03/19 (!) 170/72   07/02/19 138/72   05/28/19 120/88        Cholesterol  Lab Results   Component Value Date    LDLCHOLESTEROL 81 06/18/2019        Diabetes Self- Management Education Record    Participant Name: Rosalia Lino  Referring Provider: Dang Agosto MD   Assessment/Evaluation Ratings:  1=Needs Instruction   4=Demonstrates Understanding/Competency  2=Needs Review   NC=Not Covered    3=Comprehends Key Points  N/A=Not Applicable  Topics/Learning Objectives Pre-session Assess Date:  8/14/20 Anthony Holt. Date Reinforce Date Post- session Eval Comments   Diabetes disease process & Treatment process: Define diabetes & pre-diabetes; Identify own type of diabetes; role of the pancreas; signs/symptoms; diagnostic criteria; prevention & treatment options; contributing factors. 1    Strong family h/o DM. Women Family members also struggled w weight so pt always conscious of weight and eating healthy. Pt h/o reactive hypoglycemia. Recent issues with thyroid- sees Dr. Celena Martinez. Incorporating nutritional management into lifestyle: Describe effect of type, amount & timing of food on blood glucose; Describe basic meal planning techniques & current nutrition guideline   1- has GERD. Allergic to berries, garlic. Has cut out beef. Eats regular 6:30 am, 12:00pm and 5:00-6:00pm. Eats  increased veges (has a garden  and fruits, avoids sugary beverages. Pt cooks/shops, spouse grills. Rarely eats out except occasionally [employer] food (place in her town) Has had decreased appetite w metformin and lost 7#. Goal 150#. Avoids Etoh. What to eat - Food groups, When to eat - timing of meals and snacks, and How much to eat - portions control.        calories/ day   CHO choices/ meal   CHO choices/ day   grams of protein /day   gram of fat /day     Correctly read food labels & demonstrate CHO counting & portion control with personalized meal plan. Identify dining out strategies, & dietary sick day guidelines. 1    Reads labels   Incorporating physical activity into lifestyle:   Verbalize effect of exercise on blood glucose levels; benefits of regular exercise; safety considerations; contraindications; maintenance of activity. 1    Walks dogs twice daily, mows lawn and works in garden. Using medications safely:  Identify effects of diabetes medicines on blood glucose levels; List diabetes medication taken, action & side effects;    1    Metformin 500mg bid   Insulin / Injectable - Appropriate injection sites; proper storage; supplies needed; proper technique; safe needle disposal guidelines. n/a       Monitoring blood glucose, interpreting and using results:  Identify recommended & personal blood glucose targets; importance of testing; testing supplies; HgbA1C target levels; Factors affecting blood glucose; Importance of logging blood glucose levels for pattern recognition; ketone testing; safe lancet disposal.   1    A1c 7.0%. Rx to check bs bid. Often checking more often d/t symptoms. Prevention, detection & treatment of acute complications:  Identify symptoms of hyper & hypoglycemia, and prevention & treatment strategies. 1       Describe sick day guidelines & indications for  physician notification. Identify short term consequences of poor control. Disaster preparedness strategies    1       Prevention, detection & treatment of chronic complications:  Define the natural course of diabetes & describe the relationship of blood glucose levels to long term complications of diabetes. Identify preventative measures & standards of care. 1       Developing strategies to address psychosocial issues:  Describe feelings about living with diabetes;  Describe how stress, depression & anxiety affect blood Needle  https://youtu. be/[email or web address]    Diabetes Care: How to Inject Insulin with a Syringe  https://youtu. be/9uSSBu-5eSY       DSMS Support :   [] MNT      [] Annual update     [] Starting Fresh  adults living with diabetes or pre diabetes. [address] [place], [place]    [id number]-9:30pm- on 6 week rotation  Free -  REGISTRATION IS REQUIRED - PQIJ [phone] call for dates    []  Diabetes Group at  [address] - Free 6 week diabetes education support   classes - contact : Josefina Alvarado [phone]  for dates and locations      []ADA  Where do I Begin, Living with Type 2 diabetes ADA home support program    Web site: diabetes. org/living    Call: 1800 DIABETES  e-mail: [email or web address]     []  Internet web sites - ADAWeb site: www.diabetes. org       [] [facility] opens at 8 30 am daily for walkers, shops open at 10 am   [address] [facility], [facility]   [phone] Daily - 8:30am - 10am    3rd Tuesday of every month [facility] expert speakers visit from 9 - 10am        [] [facility], [facility], [facility], [place], [facility] (site rotate)  Call [phone] or visit   website: [email or web address] for more details   Check web site for updated times/ dates       [] [address]  [address] [facility], [address] Tuesday and Thursday -   9 :30 am- 10:30am - ongoing   [] [address]  [address] [place] [address], [address] [facility] [address] [address] [facility] Thursday 11:00am - 11:45am     [] Third Wednesday Cooking  Class  Free  Registration is required     [facility].   [address] [facility], [facility] [phone] or   Email [email or web address]   Wednesdays-5:00- 6:00 pm       [] Eat Smart  Be Active & Learn How - Free   Families & grandparents with children in home 0- 25 & pregnant moms          Various sites in community - call to find next session near you. [facility] extension office for future sessions - [facility]  Email : [email or web address] [email or web address]  Phone: [phone]     [] (SNAP-Ed)   - free nutrition education   - adults and youth, who are eligible for the Supplemental Nutrition Assistance Program    Various sites in community - call to find next session near you. [facility] [place] SNAP-Ed  contact Lori Nesbitt, Email:[email or web address]           Post Education Referrals:      [] [place] Tobacco Quit information sheet and [address] [facility] , 21       [] Dental care - [facility]     [] [facility] link  phone number - for information and referral to [address], WOUND, WEIGHT MANAGEMENT        []Other  Gómez Nelson, CHERISE, LD

## 2020-08-18 NOTE — PROGRESS NOTES
This visit is a TeleHealth encounter (During UPWWP-11 public health emergency).   Pursuant to the emergency declaration under the [address] [place] [address] waiver authority and the [employer] Act, this Virtual Visit was conducted with patient's (and/or legal guardian's) consent, to reduce the patient's risk of exposure to COVID-19 and provide necessary medical care.  The patient (and/or legal guardian) has also been advised to contact this office for worsening conditions or problems, and seek emergency medical treatment and/or call 911 if deemed necessary.     Patient identification was verified at the start of the visit: Yes     Total time spent for this encounter: 8:30- 9 30 am 8/18/20   - this visit done as one on one virtual visit as  No group sessions being offered for 2 months due to covid - 19      Services were provided through a video synchronous discussion virtually to substitute for in-person clinic visit.  Patient and provider were located at their individual home/office.        MEDICAL HISTORY:  Past Medical History        Past Medical History:   Diagnosis Date    Allergic rhinitis, cause unspecified      Asymptomatic varicose veins      Calculus of kidney      COPD (chronic obstructive pulmonary disease) (HCC)      Depression      Disorders of sacrum      Dizziness and giddiness      DJD (degenerative joint disease)      GERD (gastroesophageal reflux disease)      Hepatitis      Hypercalcemia      Hyperparathyroidism, unspecified (HCC)      Hypertension      Myalgia and myositis, unspecified      Nontoxic uninodular goiter      Osteoarthrosis, unspecified whether generalized or localized, shoulder region      Other malaise and fatigue      Other premature beats      Postsurgical hypothyroidism      Symptomatic menopausal or female climacteric states      Type II or unspecified type diabetes mellitus without mention of complication, not stated as uncontrolled      Urticaria, unspecified      Vitamin D deficiency          Family History         Family History   Problem Relation Age of Onset    Heart Disease Mother      High Cholesterol Mother      Heart Disease Father      High Cholesterol Father      Cancer Brother           mouth        Colcrys [colchicine]; Indocin [indomethacin]; Penicillins; Raspberry concentrate [flavoring agent]; and Sulfa antibiotics     There is no immunization history on file for this patient. Current Medications  Current Facility-Administered Medications          Current Outpatient Medications   Medication Sig Dispense Refill    estradiol (JEANNE) 0.075 MG/24HR Place 1 patch onto the skin every 7 days PATIENT NEEDS TO CONTACT OFFICE BEFORE ANY FURTHER REFILLS 4 patch 0    pantoprazole (PROTONIX) 20 MG tablet Take 2 tablets by mouth daily 30 tablet 11    sucralfate (CARAFATE) 1 GM tablet Take 1 tablet by mouth 4 times daily 120 tablet 3    fluticasone (FLONASE) 50 MCG/ACT nasal spray 2 sprays by Nasal route daily 3 Bottle 3    levothyroxine (SYNTHROID) 125 MCG tablet Take 1 tablet by mouth daily 90 tablet 3    esomeprazole (NEXIUM) 40 MG delayed release capsule Take 1 capsule by mouth every morning (before breakfast) 90 capsule 3    sodium chloride (OCEAN, BABY AYR) 0.65 % nasal spray 1 spray by Nasal route as needed for Congestion        Vitamin D (CHOLECALCIFEROL) 1000 UNITS CAPS capsule Take 1,000 Units by mouth daily.        cetirizine (ZYRTEC) 10 MG tablet Take 10 mg by mouth daily.        fexofenadine (ALLEGRA ALLERGY) 180 MG tablet Take 180 mg by mouth daily.          No current facility-administered medications for this encounter.       :      Comments:  Allergies:          Allergies   Allergen Reactions    Colcrys [Colchicine] Diarrhea    Indocin [Indomethacin] Diarrhea    Penicillins Hives    Raspberry Concentrate [Flavoring Agent] Hives    Sulfa Antibiotics Hives and Nausea Only        A1C blood level         Lab Results   Component Value Date     LABA1C 7.0 (H) 07/20/2020     LABA1C 6.6 (H) 06/18/2019     LABA1C 6.1 (H) 04/09/2018           Lab Results   Component Value Date     LABMICR 7 04/09/2018     CREATININE 0.78 06/18/2019        Blood pressure       BP Readings from Last 3 Encounters:   10/03/19 (!) 170/72   07/02/19 138/72   05/28/19 120/88         Cholesterol        Lab Results   Component Value Date     LDLCHOLESTEROL 81 06/18/2019         Diabetes Self- Management Education Record     Participant Name: Melvin Lino  Referring Provider: Susanna Gleason MD   Assessment/Evaluation Ratings:  1=Needs Instruction                            4=Demonstrates Understanding/Competency  2=Needs Review                                 NC=Not Covered                      3=Comprehends Key Points               N/A=Not Applicable  Topics/Learning Objectives Pre-session Assess Date:  8/14/20 Cierra Esther. Date Reinforce Date Post- session Eval Comments   Diabetes disease process & Treatment process: Define diabetes & pre-diabetes; Identify own type of diabetes; role of the pancreas; signs/symptoms; diagnostic criteria; prevention & treatment options; contributing factors. 1  8/18/20rs     Strong family h/o DM. Women Family members also struggled w weight so pt always conscious of weight and eating healthy. Pt h/o reactive hypoglycemia. Recent issues with thyroid- sees Dr. Janeth Vásquez. Incorporating nutritional management into lifestyle: Describe effect of type, amount & timing of food on blood glucose; Describe basic meal planning techniques & current nutrition guideline    1- has GERD. Allergic to berries, garlic. Has cut out beef. Eats regular 6:30 am, 12:00pm and 5:00-6:00pm. Eats  increased veges (has a garden  and fruits, avoids sugary beverages. Pt cooks/shops, spouse grills.  Rarely eats out except occasionally [employer] food (place in her town) Has had decreased appetite w metformin and lost 7#. Goal 150#. Avoids Etoh.       What to eat - Food groups, When to eat - timing of meals and snacks, and How much to eat - portions control.         calories/ day   CHO choices/ meal   CHO choices/  day   grams of protein /day   gram of fat /day     Correctly read food labels & demonstrate CHO counting & portion control with personalized meal plan. Identify dining out strategies, & dietary sick day guidelines.   1       Reads labels   Incorporating physical activity into lifestyle:   Verbalize effect of exercise on blood glucose levels; benefits of regular exercise; safety considerations; contraindications; maintenance of activity.   1  8/18/20rs     Walks dogs twice daily, mows lawn and works in garden. Using medications safely:  Identify effects of diabetes medicines on blood glucose levels; List diabetes medication taken, action & side effects;     1       Metformin 500mg bid  - now taking medication BID 8/18/20rs-   Insulin / Injectable - Appropriate injection sites; proper storage; supplies needed; proper technique; safe needle disposal guidelines.    n/a           Monitoring blood glucose, interpreting and using results:  Identify recommended & personal blood glucose targets; importance of testing; testing supplies; HgbA1C target levels; Factors affecting blood glucose; Importance of logging blood glucose levels for pattern recognition; ketone testing; safe lancet disposal.    1  8/18/20rs     A1c 7.0%. Rx to check bs bid. Often checking more often d/t symptoms. - 8/18/20- stated last 3 days BG trend fasting 103- 109 and post meals 111- 154   Prevention, detection & treatment of acute complications:  Identify symptoms of hyper & hypoglycemia, and prevention & treatment strategies.   1  8/18/20rs         Describe sick day guidelines & indications for  physician notification. Identify short term consequences of poor control.  Disaster preparedness strategies     1         Prevention, detection & treatment of chronic complications:  Define the natural course of diabetes & describe the relationship of blood glucose levels to long term complications of diabetes. Identify preventative measures & standards of care.    1           Developing strategies to address psychosocial issues:  Describe feelings about living with diabetes; Describe how stress, depression & anxiety affect blood glucose; Identify coping strategies; Identify support needed & support network available. 1  8/18/20rs     Pt has a + attitude and motivated to take care of her health, beka since her mom had complication from her DM. Developing strategies to promote health/change behavior: Identify 7 self-care behaviors; Personal health risk factors; Benefits, challenges & strategies for behavioral change;     1  8/18/20rs            Individualized goal selection.                      My goal , to help me improve my health, I will:   1. Aim to eat at least 2 vege and 2 fruit servings per day. 2.         Plan  Follow-up Appointments planned 8/19/20 - VV     Instruction Method: [x]? Lecture/Discussion  []? Power Point Presentation  [x]? Handouts  []? Return Demonstration     Education Materials/Equipment Provided (VIA Mail for phone visits)  :    [x]? Self-Management - Initial assessment - Enrolment in to ADA  Where do I Begin, Living with Type 2 diabetes ADA home support program and  handout on diabetes education classes. 8/14/20 JW     [x]? Self-Management  Class 1 -Self-Management  Class 1 - \"Diabetes - your take control guide\" - ADA booklet -  ? \"ready, set, start counting\" teaching sheet in diet section   ? GLP-1 understanding 8 core deficits puzzle sheet in the medication section  ? one day food diary and envelope for return of diet HX   ? You tube and website resource sheet-Understanding Type 2 Diabetes from Animated Diabetes Patient https://youtu. be/KOeQp35uWPS   (8/18/20rs-  patient stated she had not yet gotten the packet in the mail - used time on VV to show the animated diabetes patient video)      []? Self-Management  Class 2 - Meal Plan and handout for serving sizes, smarter snacking, Ready Set Carb Counting / Plate Method, Nutrient Conversion and International Diabetes [address] Eating for People with Diabetes and Nutrition in the [employer] - fast facts about fast food     []? Self-Management  Class 3 -  Diabetes your take control guide pages 20 - 30,  type 2 diabetes and the role of GLP- 1,  Individualized Diabetes report card      []? Self-Management Class 4 - BD Booklet  Sick Day [place] and  Dinning Out Guide , recipe hand outs and tips, diabetes Cookbooks  ( when available)      []? Self-Management - 3 month follow -  AADE7 Self care behaviors work sheets, 2020 [employer],  Online resource list - March 2020       []? Self-Management  Gestational - RN class -Resource materials sent out : care booklet - \" Gestational Diabetes Mellitus ( GDM) toolkit form [place] gestational diabetes postpartum care learning collaborative 2018. \"Simple Guidelines for meal planning with gestational diabetes. SMBG sheets to fax back to M weekly. BD  healthy injection site selection and rotation with 6 mm insulin syringe and 4 mm pen needle. Gestational diabetes handout from [facility] 2016. Did you have gestational diabetes when you were pregnant? Handout from [facility]  April 2014     []? Self-Management Gestational - RD class - My Food Plan for Gestational diabetes     []? Glucose Meter      []? Insulin Kit      []? Other       Encounter Type Date Start Time End Time Comments No Show Dates   Assessment 8/14/20 JW    8:30 10:00   []? In Person  [x]? Doxy/Telephone     Class 1 - Understanding diabetes  8/18/20rs  8 30    9 30   [x]? DOXY / TelephoneAmerican Diabetes Association  www. diabetes. org     Class 2- Nutrition and diabetes          []? Telephone  Healthy Eating with Diabetes- Elzbieta Martinez [place] of Diabetes and Digestive and Kidney   https://youtu. be/[email or web address]     Class 3 - Preventing Complications        []? Telephone     Class 4 -  In depth Nutrition and sick day care       []? Telephone  Diabetes Food hub  www. diabetesfoodhub.org      Class 5 - 3 month follow up / goal reassessment             Gestational - RN              Gestational - RD             Individual MNT             Shared Med Appt             Yearly Follow-up             Meter Instrx          How to Measure Your Blood [address]  Patient Education  https://youtu. be/nxIJeHWlhF4     Insulin Instrx         []? Pen  []? Vial & Syringe   BD Diabetes Care: How to Inject Insulin with a Pen Needle  https://youtu. be/IMBdaL0cd0C     Diabetes Care: How to Inject Insulin with a Syringe  https://youtu. be/9uSSBu-5eSY         DSMS Support :   []? MNT       []? Annual update      []? Starting Fresh  adults living with diabetes or pre diabetes. [address] [place], [place]    [id number]-6:30pm- on 6 week rotation  Free -  [address] [place] [address],[address] call for dates     []? Diabetes Group at  [address] - [facility] 6 week diabetes education support   classes - contact : [place] [facility] [phone]  for dates and locations      []? ADA  Where do I Begin, Living with Type 2 diabetes ADA home support program    Web site: diabetes. org/living    Call: 1800 DIABETES  e-mail: [email or web address]      []? Internet web sites - ADAWeb site: www.diabetes. org        []? [facility] opens at 8 30 am daily for walkers, shops open at 10 am  [address], [address] [facility]   [phone] Daily - 8:30am - 10am     3rd Tuesday of every month [facility] expert speakers visit from 9 - 10am           []?  [address] [place] [address], [facility], [facility], [place], [facility] (site rotate)  Call [phone] or visit   website: [email or web address] for more details    Check web site for updated times/ dates         []? [address]  Free class    [address], [address] [place] [address] Tuesday and Thursday -   9 :30 am- 10:30am - ongoing   []? [employer] Classes  [employer] [place]  Free classes    [address], [address] [place] [address] Thursday 11:00am - 11:45am      []? Third Wednesday Cooking  Class  Free  Registration is required       [facility]. 1100 [facility], 125 [facility] [phone] or   Email [email or web address]    Wednesdays-5:00- 6:00 pm         []? Eat Smart  Be Active & Learn How - Free   Families & grandparents with children in home 0- 25 & pregnant moms              Various sites in community - call to find next session near you. [facility] extension office for future sessions - Fred Fitzpatrick  Email : Ruddy Peralta [email or web address]. [facility]  Phone: [phone]     []? (SNAP-Ed)   - free nutrition education   - adults and youth, who are eligible for the Supplemental Nutrition Assistance Program     Various sites in community - call to find next session near you. [facility] [place] SNAP-Ed  contact Juanjo Leblanc, Email:[email or web address]              Post Education Referrals:      []? [place] Quit information sheet and [address] , [address] [facility]       []? Dental care - [facility] of [facility]      []? [address]  phone number - for information and referral to [address], WOUND, WEIGHT MANAGEMENT         []? Other  Lizbeth Myers, RN   CDE

## 2020-08-19 ENCOUNTER — HOSPITAL ENCOUNTER (OUTPATIENT)
Dept: DIABETES SERVICES | Age: 65
Setting detail: THERAPIES SERIES
Discharge: HOME | End: 2020-08-19
Payer: MEDICARE

## 2020-08-19 PROCEDURE — G0108 DIAB MANAGE TRN  PER INDIV: HCPCS

## 2020-08-19 NOTE — PROGRESS NOTES
This visit is a TeleHealth encounter (During NRYPB-28 public health emergency).   Pursuant to the emergency declaration under the [address] [place] [address], Community Health waiver authority and the [employer] Act, this Virtual Visit was conducted with patient's (and/or legal guardian's) consent, to reduce the patient's risk of exposure to COVID-19 and provide necessary medical care.  The patient (and/or legal guardian) has also been advised to contact this office for worsening conditions or problems, and seek emergency medical treatment and/or call 911 if deemed necessary.     Patient identification was verified at the start of the visit: Yes     Total time spent for this encounter: 8:30- 9 30 am 8/18/20   - this visit done as one on one virtual visit as  No group sessions being offered for 2 months due to covid - 19   8/19/20 JW 1 hour, 8:30-9:30am     Services were provided through a video synchronous discussion virtually to substitute for in-person clinic visit.  Patient and provider were located at their individual home/office.        MEDICAL HISTORY:  Past Medical History        Past Medical History:   Diagnosis Date    Allergic rhinitis, cause unspecified      Asymptomatic varicose veins      Calculus of kidney      COPD (chronic obstructive pulmonary disease) (HCC)      Depression      Disorders of sacrum      Dizziness and giddiness      DJD (degenerative joint disease)      GERD (gastroesophageal reflux disease)      Hepatitis      Hypercalcemia      Hyperparathyroidism, unspecified (HCC)      Hypertension      Myalgia and myositis, unspecified      Nontoxic uninodular goiter      Osteoarthrosis, unspecified whether generalized or localized, shoulder region      Other malaise and fatigue      Other premature beats      Postsurgical hypothyroidism      Symptomatic menopausal or female climacteric states      Type II or unspecified type diabetes mellitus without mention of complication, not stated as uncontrolled      Urticaria, unspecified      Vitamin D deficiency          Family History         Family History   Problem Relation Age of Onset    Heart Disease Mother      High Cholesterol Mother      Heart Disease Father      High Cholesterol Father      Cancer Brother           mouth        Colcrys [colchicine]; Indocin [indomethacin]; Penicillins; Raspberry concentrate [flavoring agent]; and Sulfa antibiotics     There is no immunization history on file for this patient. Current Medications  Current Facility-Administered Medications          Current Outpatient Medications   Medication Sig Dispense Refill    estradiol (JEANNE) 0.075 MG/24HR Place 1 patch onto the skin every 7 days PATIENT NEEDS TO CONTACT OFFICE BEFORE ANY FURTHER REFILLS 4 patch 0    pantoprazole (PROTONIX) 20 MG tablet Take 2 tablets by mouth daily 30 tablet 11    sucralfate (CARAFATE) 1 GM tablet Take 1 tablet by mouth 4 times daily 120 tablet 3    fluticasone (FLONASE) 50 MCG/ACT nasal spray 2 sprays by Nasal route daily 3 Bottle 3    levothyroxine (SYNTHROID) 125 MCG tablet Take 1 tablet by mouth daily 90 tablet 3    esomeprazole (NEXIUM) 40 MG delayed release capsule Take 1 capsule by mouth every morning (before breakfast) 90 capsule 3    sodium chloride (OCEAN, BABY AYR) 0.65 % nasal spray 1 spray by Nasal route as needed for Congestion        Vitamin D (CHOLECALCIFEROL) 1000 UNITS CAPS capsule Take 1,000 Units by mouth daily.        cetirizine (ZYRTEC) 10 MG tablet Take 10 mg by mouth daily.        fexofenadine (ALLEGRA ALLERGY) 180 MG tablet Take 180 mg by mouth daily.          No current facility-administered medications for this encounter.       :      Comments:  Allergies:          Allergies   Allergen Reactions    Colcrys [Colchicine] Diarrhea    Indocin [Indomethacin] Diarrhea    Penicillins Hives    Raspberry Concentrate [Flavoring Agent] Hives    Sulfa Antibiotics Hives and Nausea Only        A1C blood level         Lab Results   Component Value Date     LABA1C 7.0 (H) 07/20/2020     LABA1C 6.6 (H) 06/18/2019     LABA1C 6.1 (H) 04/09/2018           Lab Results   Component Value Date     LABMICR 7 04/09/2018     CREATININE 0.78 06/18/2019        Blood pressure       BP Readings from Last 3 Encounters:   10/03/19 (!) 170/72   07/02/19 138/72   05/28/19 120/88         Cholesterol        Lab Results   Component Value Date     LDLCHOLESTEROL 81 06/18/2019         Diabetes Self- Management Education Record     Participant Name: Shelton Fothergill Well  Referring Provider: Ana Daly MD   Assessment/Evaluation Ratings:  1=Needs Instruction                            4=Demonstrates Understanding/Competency  2=Needs Review                                 NC=Not Covered                      3=Comprehends Key Points               N/A=Not Applicable  Topics/Learning Objectives Pre-session Assess Date:  8/14/20 Brittney Rascon. Date Reinforce Date Post- session Eval Comments   Diabetes disease process & Treatment process: Define diabetes & pre-diabetes; Identify own type of diabetes; role of the pancreas; signs/symptoms; diagnostic criteria; prevention & treatment options; contributing factors. 1  8/18/20rs     Strong family h/o DM. Women Family members also struggled w weight so pt always conscious of weight and eating healthy. Pt h/o reactive hypoglycemia. Recent issues with thyroid- sees Dr. Zoey Clay. Incorporating nutritional management into lifestyle: Describe effect of type, amount & timing of food on blood glucose; Describe basic meal planning techniques & current nutrition guideline    1- has GERD. Allergic to berries, garlic. Has cut out beef. Eats regular 6:30 am, 12:00pm and 5:00-6:00pm. Eats  increased veges (has a garden  and fruits, avoids sugary beverages. Pt cooks/shops, spouse grills.  Rarely eats out except occasionally [place] food (place in her town) Has had decreased appetite w metformin and lost 7#. Goal 150#. Avoids Etoh.  8/19/20 JW showed video. Doing well. Eating snacks now between meals to help w low bs     What to eat - Food groups, When to eat - timing of meals and snacks, and How much to eat - portions control. 1500 calories/ day   CHO choices/ meal3,1,3,1,3,1   CHO choices/  day   grams of protein /day   gram of fat /day     Correctly read food labels & demonstrate CHO counting & portion control with personalized meal plan. Identify dining out strategies, & dietary sick day guidelines.   1  8/19/20 JW     Reads labels   Incorporating physical activity into lifestyle:   Verbalize effect of exercise on blood glucose levels; benefits of regular exercise; safety considerations; contraindications; maintenance of activity.   1  8/18/20rs     Walks dogs twice daily, mows lawn and works in garden. Using medications safely:  Identify effects of diabetes medicines on blood glucose levels; List diabetes medication taken, action & side effects;     1       Metformin 500mg bid  - now taking medication BID 8/18/20rs-   Insulin / Injectable - Appropriate injection sites; proper storage; supplies needed; proper technique; safe needle disposal guidelines.    n/a           Monitoring blood glucose, interpreting and using results:  Identify recommended & personal blood glucose targets; importance of testing; testing supplies; HgbA1C target levels; Factors affecting blood glucose; Importance of logging blood glucose levels for pattern recognition; ketone testing; safe lancet disposal.    1  8/18/20rs     A1c 7.0%. Rx to check bs bid. Often checking more often d/t symptoms. - 8/18/20- stated last 3 days BG trend fasting 103- 109 and post meals 111- 154   Prevention, detection & treatment of acute complications:  Identify symptoms of hyper & hypoglycemia, and prevention & treatment strategies.      1  8/18/20rs         Describe sick day guidelines & indications for  physician notification. Identify short term consequences of poor control. Disaster preparedness strategies     1           Prevention, detection & treatment of chronic complications:  Define the natural course of diabetes & describe the relationship of blood glucose levels to long term complications of diabetes. Identify preventative measures & standards of care.    1           Developing strategies to address psychosocial issues:  Describe feelings about living with diabetes; Describe how stress, depression & anxiety affect blood glucose; Identify coping strategies; Identify support needed & support network available. 1  8/18/20rs     Pt has a + attitude and motivated to take care of her health, beka since her mom had complication from her DM. Developing strategies to promote health/change behavior: Identify 7 self-care behaviors; Personal health risk factors; Benefits, challenges & strategies for behavioral change;     1  8/18/20rs            Individualized goal selection.                      My goal , to help me improve my health, I will:   1. Aim to eat at least 2 vege and 2 fruit servings per day. 2.         Plan  Follow-up Appointments planned 8/19/20 - VV     Instruction Method: [x]? Lecture/Discussion  []? Power Point Presentation  [x]? Handouts  []? Return Demonstration     Education Materials/Equipment Provided (VIA Mail for phone visits)  :    [x]? Self-Management - Initial assessment - Enrolment in to ADA  Where do I Begin, Living with Type 2 diabetes ADA home support program and  handout on diabetes education classes. 8/14/20 JW     [x]? Self-Management  Class 1 -Self-Management  Class 1 - \"Diabetes - your take control guide\" - ADA booklet -  ? \"ready, set, start counting\" teaching sheet in diet section   ? GLP-1 understanding 8 core deficits puzzle sheet in the medication section  ? one day food diary and envelope for return of diet HX   ?   You tube and website resource sheet-Understanding Type 2 Diabetes from Animated Diabetes Patient https://youtu. be/TDxWp86lOGR   (8/18/20rs-  patient stated she had not yet gotten the packet in the mail - used time on VV to show the animated diabetes patient video)      [x]? Self-Management  Class 2 - Meal Plan and handout for serving sizes, smarter snacking, Ready Set Carb Counting / Plate Method, Nutrient Conversion and International Diabetes [address] Eating for People with Diabetes and Nutrition in the [employer] - fast facts about fast food8/19/20 JW     []? Self-Management  Class 3 -  Diabetes your take control guide pages 20 - 30,  type 2 diabetes and the role of GLP- 1,  Individualized Diabetes report card      []? Self-Management Class 4 - BD Booklet  Sick Day [place] and  Dinning Out Guide , recipe hand outs and tips, diabetes Cookbooks  ( when available)      []? Self-Management - 3 month follow -  AADE7 Self care behaviors work sheets, 2020 [employer],  Online resource list - March 2020       []? Self-Management  Gestational - RN class -Resource materials sent out : care booklet - \" Gestational Diabetes Mellitus ( GDM) toolkit form [place] gestational diabetes postpartum care learning collaborative 2018. \"Simple Guidelines for meal planning with gestational diabetes. SMBG sheets to fax back to M weekly. BD  healthy injection site selection and rotation with 6 mm insulin syringe and 4 mm pen needle. Gestational diabetes handout from [facility] 2016. Did you have gestational diabetes when you were pregnant? Handout from [facility]  April 2014     []? Self-Management Gestational - RD class - My Food Plan for Gestational diabetes     []? Glucose Meter      []? Insulin Kit      []? Other       Encounter Type Date Start Time End Time Comments No Show Dates   Assessment 8/14/20 JW    8:30 10:00   []? In Person  [x]? Doxy/Telephone     Class 1 - Understanding diabetes  8/18/20rs  8 30    9 30   [x]?  DOXY / [employer]ider Mend[employer] Diabetes Association  www. diabetes. org     Class 2- Nutrition and diabetes     8/19/20 JW  8:30  9:30 []? Telephone  Healthy Eating with Diabetes- Automatic Data of Diabetes and Digestive and Kidney   https://youtu. be/py8dc1Pm6E2     Class 3 - Preventing Complications        []? Telephone     Class 4 -  In depth Nutrition and sick day care       []? Telephone  Diabetes Food hub  www. diabetesfoodhub.org      Class 5 - 3 month follow up / goal reassessment             Gestational - RN              Gestational - RD             Individual MNT             Shared Med Appt             Yearly Follow-up             Meter Instrx          How to Measure Your Blood [address] Patient Education  https://youtu. be/nxIJeHWlhF4     Insulin Instrx         []? Pen  []? Vial & Syringe   BD Diabetes Care: How to Inject Insulin with a Pen Needle  https://youtu. be/UPTkiJ1sw9N     Diabetes Care: How to Inject Insulin with a Syringe  https://youtu. be/9uSSBu-5eSY         DSMS Support :   []? MNT       []? Annual update      []? Starting Fresh  adults living with diabetes or pre diabetes. [address] [place], [place]    [id number]-2:30pm- on 6 week rotation  Free -  [address] [facility] call for dates     []? Diabetes Group at  [address] - [facility] 6 week diabetes education support   classes - contact : [facility] [phone]  for dates and locations      []? ADA  Where do I Begin, Living with Type 2 diabetes ADA home support program    Web site: diabetes. org/living    Call: 1800 DIABETES  e-mail: Kimberly@[employer]. org      []? Internet web sites - ADAWeb site: www.diabetes. org        []?  [facility] opens at 8 30 am daily for walkers, shops open at 10 am  [facility], [address] [place] [address]   [phone] Daily - 8:30am - 10am     3rd Tuesday of every month Michael Schmidt expert speakers visit from 9 - 10am         []? [facility], [place], [place], [place], [facility] kumar (site rotate)  Call [phone] or visit   website: [email or web address] for more details    Check web site for updated times/ dates         []? [address]  Free class    [address], [address] [address] [place] [address] Tuesday and Thursday -   9 :30 am- 10:30am - ongoing   []? [employer] Exercise Classes  [employer] [place]  Free classes    [address], [address] [facility] Thursday 11:00am - 11:45am      []? Third Wednesday Cooking  Class  Free  Registration is required       [facility]. [facility], [facility] [phone] or   Email [email or web address]    Wednesdays-5:00- 6:00 pm         []? Eat Smart  Be Active & Learn How - Free   Families & grandparents with children in home 0- 25 & pregnant moms              Various sites in community - call to find next session near you. [facility] extension office for future sessions - Melly Berkowitz  Email : [email or web address]  Phone: [phone]     []? (SNAP-Ed)   - free nutrition education   - adults and youth, who are eligible for the Supplemental Nutrition Assistance Program     Various sites in community - call to find next session near you. [facility] PASSTRANNT-CRANBERRY-ER SNAP-Ed  contact Monica Rueda, Email:[email or web address]              Post Education Referrals:      []? [place] Tobacco Quit information sheet and [address] , [address] [facility]       []? Dental care - [facility]      []? [address]  phone number - for information and referral to [address] [facility] [address] [facility] [address], WOUND, WEIGHT MANAGEMENT         []? Other  Magalis Haro, RD, LD   CDE

## 2020-09-08 ENCOUNTER — HOSPITAL ENCOUNTER (OUTPATIENT)
Age: 65
Setting detail: SPECIMEN
Discharge: HOME | End: 2020-09-08
Payer: MEDICARE

## 2020-09-09 LAB
THYROXINE, FREE: 1.73 NG/DL (ref 0.93–1.7)
TSH SERPL DL<=0.05 MIU/L-ACNC: 0.09 MIU/L (ref 0.3–5)

## 2020-09-12 LAB — 17 OH PROGESTERONE: 16.94 NG/DL

## 2020-09-15 ENCOUNTER — TELEPHONE (OUTPATIENT)
Dept: PHARMACY | Facility: CLINIC | Age: 65
End: 2020-09-15

## 2020-09-15 NOTE — LETTER
Using a Statin for Your Patient with Diabetes    Date: 09/15/20    Dear Santy Mcintosh MD,  Fax: [phone]    Concerning patient: Andie Lino    :  1955    Consider adding a STATIN to your patients regimen for the following reason: Patient with high ASCVD risk score of 29.9%    The 2013 [employer] of Cardiology/American Heart Association guidelines recommend that the benefits of using a statin outweigh the risk for diabetes patients age 36 to 76. The American Diabetes Association Standards of Medical Care also suggest a statin for most diabetes patients age 36 to 76. These recommendations are based on primary and secondary prevention benefits of statins on cardiovascular events and mortality in this patient population. A moderate-intensity statin (e.g., atorvastatin 20 mg) is recommended for patients in this age group without additional cardiovascular risk factors. Patients with cardiovascular disease or additional risk factors should receive a high-intensity statin (e.g., atorvastatin 40 mg or 80 mg). If you agree, send a prescription to your patients pharmacy. Please follow up with the patient for any medication changes as appropriate. Please also feel free to contact me at the number below with any questions or concerns, or if you would like me to further discuss with your patient. Thank you for your help and consideration in caring for this patient.   Respectfully,      Jennifer Tidwell, PharmD  [address]  Phone: [phone], option 7  Fax: [phone]

## 2020-09-15 NOTE — TELEPHONE ENCOUNTER
Lab Results   Component Value Date    VLDL NOT REPORTED (H) 06/18/2019    VLDL NOT REPORTED 04/09/2018    VLDL NOT REPORTED 10/21/2016     Lab Results   Component Value Date    CHOLHDLRATIO 2.9 06/18/2019    CHOLHDLRATIO 2.7 04/09/2018    CHOLHDLRATIO 3.2 10/21/2016     Lab Results   Component Value Date    ALT 36 10/07/2019        The 10-year ASCVD risk score (Israel Fernández, et al., 2013) is: 29.9%    Values used to calculate the score:      Age: 75 years      Sex: Female      Is Non- : No      Diabetic: Yes      Tobacco smoker: Yes      Systolic Blood Pressure: 947 mmHg      Is BP treated: No      HDL Cholesterol: 60 mg/dL      Total Cholesterol: 175 mg/dL      ASSESSMENT:    2019 ADA Guidelines Age:      >/= 36years old:   o History of ASCVD or 10-year ASCVD risk > 20% - high-intensity statin is recommended. o Patient with high ASCVD risk score    PLAN:  Consider moderate or high intensity statin.  Will fax provider    Thank you,    Jennifer Perez, PharmD, [facility] Pharmacist  Direct: [phone], Ext [phone]  ===================================  CLINICAL PHARMACY CONSULT: MED RECONCILIATION/REVIEW [place]  22. Tracking Only    PHSO: Yes  Total # of Interventions Recommended: 1  - New Order #: 1 New Medication Order Reason(s): Needs Additional Medication Therapy  Recommended intervention potential cost savings: 0  Total Interventions Accepted: 0  Time Spent (min): [address]

## 2020-09-23 ENCOUNTER — HOSPITAL ENCOUNTER (OUTPATIENT)
Dept: DIABETES SERVICES | Age: 65
Setting detail: THERAPIES SERIES
Discharge: HOME | End: 2020-09-23
Payer: MEDICARE

## 2020-09-23 PROCEDURE — G0108 DIAB MANAGE TRN  PER INDIV: HCPCS

## 2020-09-23 NOTE — PROGRESS NOTES
This visit is a TeleHealth encounter (During CURMU-28 public health emergency).   Pursuant to the emergency declaration under the [address] [place] [address], Critical access hospital waiver authority and the [employer] Act, this Virtual Visit was conducted with patient's (and/or legal guardian's) consent, to reduce the patient's risk of exposure to COVID-19 and provide necessary medical care.  The patient (and/or legal guardian) has also been advised to contact this office for worsening conditions or problems, and seek emergency medical treatment and/or call 911 if deemed necessary.     Patient identification was verified at the start of the visit: Yes     Total time spent for this encounter: 8:30- 9 :00am 9/23/20  - this visit done as one on one virtual visit as  No group sessions being offered for 2 months due to covid - 19        Services were provided through a video synchronous discussion virtually to substitute for in-person clinic visit.  Patient and provider were located at their individual home/office.        MEDICAL HISTORY:  Past Medical History        Past Medical History:   Diagnosis Date    Allergic rhinitis, cause unspecified      Asymptomatic varicose veins      Calculus of kidney      COPD (chronic obstructive pulmonary disease) (HCC)      Depression      Disorders of sacrum      Dizziness and giddiness      DJD (degenerative joint disease)      GERD (gastroesophageal reflux disease)      Hepatitis      Hypercalcemia      Hyperparathyroidism, unspecified (HCC)      Hypertension      Myalgia and myositis, unspecified      Nontoxic uninodular goiter      Osteoarthrosis, unspecified whether generalized or localized, shoulder region      Other malaise and fatigue      Other premature beats      Postsurgical hypothyroidism      Symptomatic menopausal or female climacteric states      Type II or unspecified type diabetes mellitus without mention of complication, not stated as uncontrolled      Urticaria, unspecified      Vitamin D deficiency          Family History         Family History   Problem Relation Age of Onset    Heart Disease Mother      High Cholesterol Mother      Heart Disease Father      High Cholesterol Father      Cancer Brother           mouth        Colcrys [colchicine]; Indocin [indomethacin]; Penicillins; Raspberry concentrate [flavoring agent]; and Sulfa antibiotics     There is no immunization history on file for this patient. Current Medications  Current Facility-Administered Medications          Current Outpatient Medications   Medication Sig Dispense Refill    estradiol (JEANNE) 0.075 MG/24HR Place 1 patch onto the skin every 7 days PATIENT NEEDS TO CONTACT OFFICE BEFORE ANY FURTHER REFILLS 4 patch 0    pantoprazole (PROTONIX) 20 MG tablet Take 2 tablets by mouth daily 30 tablet 11    sucralfate (CARAFATE) 1 GM tablet Take 1 tablet by mouth 4 times daily 120 tablet 3    fluticasone (FLONASE) 50 MCG/ACT nasal spray 2 sprays by Nasal route daily 3 Bottle 3    levothyroxine (SYNTHROID) 125 MCG tablet Take 1 tablet by mouth daily 90 tablet 3    esomeprazole (NEXIUM) 40 MG delayed release capsule Take 1 capsule by mouth every morning (before breakfast) 90 capsule 3    sodium chloride (OCEAN, BABY AYR) 0.65 % nasal spray 1 spray by Nasal route as needed for Congestion        Vitamin D (CHOLECALCIFEROL) 1000 UNITS CAPS capsule Take 1,000 Units by mouth daily.        cetirizine (ZYRTEC) 10 MG tablet Take 10 mg by mouth daily.        fexofenadine (ALLEGRA ALLERGY) 180 MG tablet Take 180 mg by mouth daily.          No current facility-administered medications for this encounter.       :      Comments:  Allergies:          Allergies   Allergen Reactions    Colcrys [Colchicine] Diarrhea    Indocin [Indomethacin] Diarrhea    Penicillins Hives    Raspberry Concentrate [Flavoring Agent] Hives    Sulfa appetite w metformin and lost 7#. Goal 150#. Avoids Etoh.  8/19/20 JW showed video. Doing well. Eating snacks now between meals to help w low bs     What to eat - Food groups, When to eat - timing of meals and snacks, and How much to eat - portions control. 1500 calories/ day   CHO choices/ meal3,1,3,1,3,1   CHO choices/  day   grams of protein /day   gram of fat /day     Correctly read food labels & demonstrate CHO counting & portion control with personalized meal plan. Identify dining out strategies, & dietary sick day guidelines.   1  8/19/20 JW     Reads labels   Incorporating physical activity into lifestyle:   Verbalize effect of exercise on blood glucose levels; benefits of regular exercise; safety considerations; contraindications; maintenance of activity.   1  8/18/20rs     Walks dogs twice daily, mows lawn and works in garden. 9/23/20- considering ways to be more active after dinner - thinking of indoor activities for fall and winter as day light is shorter - sent list of being active youtube links    Using medications safely:  Identify effects of diabetes medicines on blood glucose levels; List diabetes medication taken, action & side effects;     1       Metformin 500mg bid  - now taking medication BID 8/18/20rs-   Insulin / Injectable - Appropriate injection sites; proper storage; supplies needed; proper technique; safe needle disposal guidelines.    n/a           Monitoring blood glucose, interpreting and using results:  Identify recommended & personal blood glucose targets; importance of testing; testing supplies; HgbA1C target levels; Factors affecting blood glucose; Importance of logging blood glucose levels for pattern recognition; ketone testing; safe lancet disposal.    1  8/18/20rs     A1c 7.0%. Rx to check bs bid. Often checking more often d/t symptoms.    - 8/18/20- stated last 3 days BG trend fasting 103- 109 and post meals 111- 154   Prevention, detection & treatment of acute \"ready, set, start counting\" teaching sheet in diet section   ? GLP-1 understanding 8 core deficits puzzle sheet in the medication section  ? one day food diary and envelope for return of diet HX   ? You tube and website resource sheet-Understanding Type 2 Diabetes from Animated Diabetes Patient [email or web address] be/[email or web address]   (8/18/20rs-  patient stated she had not yet gotten the packet in the mail - used time on VV to show the animated diabetes patient video)      [x]? Self-Management  Class 2 - Meal Plan and handout for serving sizes, smarter snacking, Ready Set Carb Counting / Plate Method, Nutrient Conversion and International Diabetes [address] Eating for People with Diabetes and Nutrition in the [employer] - fast facts about fast food8/19/20 JW     [x]? Self-Management  Class 3 -  Diabetes your take control guide pages 20 - 30,  type 2 diabetes and the role of GLP- 1,  Individualized Diabetes report card  --9/23/20rs      []? Self-Management Class 4 - BD Booklet  Sick Day [place] and  Dinning Out Guide , recipe hand outs and tips, diabetes Cookbooks  ( when available)      []? Self-Management - 3 month follow -  AADE7 Self care behaviors work sheets, 2020 [employer],  Online resource list - March 2020       []? Self-Management  Gestational - RN class -Resource materials sent out : care booklet - \" Gestational Diabetes Mellitus ( GDM) toolkit form [place] gestational diabetes postpartum care learning collaborative 2018. \"Simple Guidelines for meal planning with gestational diabetes. SMBG sheets to fax back to M weekly. BD  healthy injection site selection and rotation with 6 mm insulin syringe and 4 mm pen needle. Gestational diabetes handout from [facility] 2016. Did you have gestational diabetes when you were pregnant? Handout from [facility]  April 2014     []? Self-Management Gestational - RD class - My Food Plan for Gestational diabetes     []? Glucose Meter    []?Insulin Kit      []? Other       Encounter Type Date Start Time End Time Comments No Show Dates   Assessment 8/14/20 JW    8:30 10:00   []? In Person  [x]? Doxy/Telephone     Class 1 - Understanding diabetes  8/18/20rs  8 30    9 30   [x]? DOXY / TelephoneAmerican Diabetes Association  www. diabetes. org     Class 2- Nutrition and diabetes     8/19/20 JW  8:30  9:30 []? Telephone  Healthy Eating with Diabetes- Automatic Data of Diabetes and Digestive and Kidney   [email or web address] be/[email or web address]     Class 3 - Preventing Complications 7/23/77IM    8 :00    8 :30   [x]? Telephone     Class 4 -  In depth Nutrition and sick day care       []? Telephone  Diabetes Food hub  www. diabetesfoodhub.org      Class 5 - 3 month follow up / goal reassessment             Gestational - RN              Gestational - RD             Individual MNT             Shared Med Appt             Yearly Follow-up             Meter Instrx          How to Measure Your Blood [address]  Patient Education  [email or web address]     Insulin Instrx         []? Pen  []? Vial & Syringe   BD Diabetes Care: How to Inject Insulin with a Pen Needle  [email or web address] [email or web address]     Diabetes Care: How to Inject Insulin with a Syringe  [email or web address]         DSMS Support :   []? MNT       []? Annual update      []? Starting Fresh  adults living with diabetes or pre diabetes. [address] [place], [place]    [id number]-2:30pm- on 6 week rotation  Free -  [address] [facility] call for dates     []? Diabetes Group at  [address] - Free 6 week diabetes education support   classes - contact : [address] [facility] [phone]  for dates and locations      []? ADA  Where do I Begin, Living with Type 2 diabetes ADA home support program    Web site: diabetes. org/living    Call: 1800 DIABETES  e-mail: Titi@Meetingmix.com. org      []?   Internet web sites - ADAWeb site: www.diabetes. org        []? [facility] opens at 8 30 am daily for walkers, shops open at 10 am  [address], [address] [facility]   [phone] Daily - 8:30am - 10am     3rd Tuesday of every month [facility] expert speakers visit from 9 - 10am           []? [address] [facility], [facility], [facility], [place], [facility] kumar (site rotate)  Call [phone] or visit   website: [email or web address] for more details    Check web site for updated times/ dates         []? [address]  Free class    [address], [address] [place] [address] Tuesday and Thursday -   9 :30 am- 10:30am - ongoing   []? [employer] Exercise Classes  Cardio [employer]le  Free classes    [address], [address] [place] [address] Thursday 11:00am - 11:45am      []? Third Wednesday Cooking  Class  Free  Registration is required       [address] [facility]. [address] [facility], [address] [place] [address] [phone] or   Email [email or web address]    Wednesdays-5:00- 6:00 pm         []? Eat Smart  Be Active & Learn How - Free   Families & grandparents with children in home 0- 25 & pregnant moms              Various sites in community - call to find next session near you. [employer]UGUSTINE[employer] extension office for future sessions - Jack Kwan  Email : [email or web address]  Phone: [phone]     []? (SNAP-Ed)   - free nutrition education   - adults and youth, who are eligible for the Supplemental Nutrition Assistance Program     Various sites in community - call to find next session near you. whoactually SNAP-Ed  contact Yulia Aviles, Email:[email or web address]              Post Education Referrals:      []? [place] Tobacco Quit information sheet and [address] , [address] [facility]       []? Dental care - [facility]      []? [facility]  phone number - for information and referral to [address], WOUND, WEIGHT MANAGEMENT         []? Other  Ngozi Mckenna, RN   CDE

## 2020-10-21 ENCOUNTER — HOSPITAL ENCOUNTER (OUTPATIENT)
Age: 65
Setting detail: SPECIMEN
Discharge: HOME | End: 2020-10-21
Payer: MEDICARE

## 2020-10-22 LAB
ALBUMIN SERPL-MCNC: 4.4 G/DL (ref 3.5–5.2)
ALBUMIN/GLOBULIN RATIO: 2.2 (ref 1–2.5)
ALP BLD-CCNC: 74 U/L (ref 35–104)
ALT SERPL-CCNC: 31 U/L (ref 5–33)
ANION GAP SERPL CALCULATED.3IONS-SCNC: 13 MMOL/L (ref 9–17)
AST SERPL-CCNC: 26 U/L
BILIRUB SERPL-MCNC: 0.25 MG/DL (ref 0.3–1.2)
BUN BLDV-MCNC: 13 MG/DL (ref 8–23)
BUN/CREAT BLD: ABNORMAL (ref 9–20)
CALCIUM SERPL-MCNC: 9 MG/DL (ref 8.6–10.4)
CHLORIDE BLD-SCNC: 103 MMOL/L (ref 98–107)
CO2: 24 MMOL/L (ref 20–31)
CREAT SERPL-MCNC: 0.85 MG/DL (ref 0.5–0.9)
CREATININE URINE: 45.6 MG/DL (ref 28–217)
GFR AFRICAN AMERICAN: >60 ML/MIN
GFR NON-AFRICAN AMERICAN: >60 ML/MIN
GFR SERPL CREATININE-BSD FRML MDRD: ABNORMAL ML/MIN/{1.73_M2}
GFR SERPL CREATININE-BSD FRML MDRD: ABNORMAL ML/MIN/{1.73_M2}
GLUCOSE BLD-MCNC: 69 MG/DL (ref 70–99)
MICROALBUMIN/CREAT 24H UR: <12 MG/L
MICROALBUMIN/CREAT UR-RTO: NORMAL MCG/MG CREAT
POTASSIUM SERPL-SCNC: 4.3 MMOL/L (ref 3.7–5.3)
SODIUM BLD-SCNC: 140 MMOL/L (ref 135–144)
THYROXINE, FREE: 1.38 NG/DL (ref 0.93–1.7)
TOTAL PROTEIN: 6.4 G/DL (ref 6.4–8.3)
TSH SERPL DL<=0.05 MIU/L-ACNC: 0.34 MIU/L (ref 0.3–5)

## 2020-10-23 ENCOUNTER — TELEPHONE (OUTPATIENT)
Dept: DIABETES SERVICES | Age: 65
End: 2020-10-23

## 2021-01-18 ENCOUNTER — HOSPITAL ENCOUNTER (OUTPATIENT)
Age: 66
Setting detail: SPECIMEN
Discharge: HOME | End: 2021-01-18
Payer: MEDICARE

## 2021-01-18 LAB
THYROXINE, FREE: 1.49 NG/DL (ref 0.93–1.7)
TSH SERPL DL<=0.05 MIU/L-ACNC: 0.53 MIU/L (ref 0.3–5)

## 2021-07-22 ENCOUNTER — HOSPITAL ENCOUNTER (OUTPATIENT)
Age: 66
Setting detail: SPECIMEN
Discharge: HOME | End: 2021-07-22
Payer: MEDICARE

## 2021-07-23 LAB
THYROXINE, FREE: 1.42 NG/DL (ref 0.93–1.7)
TSH SERPL DL<=0.05 MIU/L-ACNC: 1.68 MIU/L (ref 0.3–5)

## 2021-08-06 ENCOUNTER — HOSPITAL ENCOUNTER (OUTPATIENT)
Dept: WOMENS IMAGING | Age: 66
Discharge: HOME | End: 2021-08-08
Payer: MEDICARE

## 2021-08-06 DIAGNOSIS — Z12.31 BREAST CANCER SCREENING BY MAMMOGRAM: ICD-10-CM

## 2021-08-06 PROCEDURE — 77063 BREAST TOMOSYNTHESIS BI: CPT

## 2021-08-19 ENCOUNTER — HOSPITAL ENCOUNTER (OUTPATIENT)
Age: 66
Setting detail: SPECIMEN
Discharge: HOME | End: 2021-08-19
Payer: MEDICARE

## 2021-08-19 LAB
ABSOLUTE EOS #: 0.08 K/UL (ref 0–0.44)
ABSOLUTE IMMATURE GRANULOCYTE: <0.03 K/UL (ref 0–0.3)
ABSOLUTE LYMPH #: 1.93 K/UL (ref 1.1–3.7)
ABSOLUTE MONO #: 0.49 K/UL (ref 0.1–1.2)
ALBUMIN SERPL-MCNC: 4.5 G/DL (ref 3.5–5.2)
ALBUMIN/GLOBULIN RATIO: 2 (ref 1–2.5)
ALP BLD-CCNC: 74 U/L (ref 35–104)
ALT SERPL-CCNC: 27 U/L (ref 5–33)
ANION GAP SERPL CALCULATED.3IONS-SCNC: 11 MMOL/L (ref 9–17)
AST SERPL-CCNC: 21 U/L
BASOPHILS # BLD: 1 % (ref 0–2)
BASOPHILS ABSOLUTE: 0.05 K/UL (ref 0–0.2)
BILIRUB SERPL-MCNC: 0.46 MG/DL (ref 0.3–1.2)
BILIRUBIN URINE: NEGATIVE
BUN BLDV-MCNC: 16 MG/DL (ref 8–23)
BUN/CREAT BLD: ABNORMAL (ref 9–20)
CALCIUM SERPL-MCNC: 9.7 MG/DL (ref 8.6–10.4)
CHLORIDE BLD-SCNC: 105 MMOL/L (ref 98–107)
CHOLESTEROL, FASTING: 182 MG/DL
CHOLESTEROL/HDL RATIO: 3.1
CO2: 26 MMOL/L (ref 20–31)
COLOR: YELLOW
COMMENT UA: ABNORMAL
CREAT SERPL-MCNC: 0.83 MG/DL (ref 0.5–0.9)
DIFFERENTIAL TYPE: NORMAL
EOSINOPHILS RELATIVE PERCENT: 1 % (ref 1–4)
GFR AFRICAN AMERICAN: >60 ML/MIN
GFR NON-AFRICAN AMERICAN: >60 ML/MIN
GFR SERPL CREATININE-BSD FRML MDRD: ABNORMAL ML/MIN/{1.73_M2}
GFR SERPL CREATININE-BSD FRML MDRD: ABNORMAL ML/MIN/{1.73_M2}
GLUCOSE BLD-MCNC: 106 MG/DL (ref 70–99)
GLUCOSE URINE: NEGATIVE
HCT VFR BLD CALC: 45 % (ref 36.3–47.1)
HDLC SERPL-MCNC: 58 MG/DL
HEMOGLOBIN: 14.9 G/DL (ref 11.9–15.1)
IMMATURE GRANULOCYTES: 0 %
KETONES, URINE: ABNORMAL
LDL CHOLESTEROL: 99 MG/DL (ref 0–130)
LEUKOCYTE ESTERASE, URINE: NEGATIVE
LYMPHOCYTES # BLD: 34 % (ref 24–43)
MCH RBC QN AUTO: 32 PG (ref 25.2–33.5)
MCHC RBC AUTO-ENTMCNC: 33.1 G/DL (ref 28.4–34.8)
MCV RBC AUTO: 96.8 FL (ref 82.6–102.9)
MONOCYTES # BLD: 9 % (ref 3–12)
NITRITE, URINE: NEGATIVE
NRBC AUTOMATED: 0 PER 100 WBC
PDW BLD-RTO: 12.6 % (ref 11.8–14.4)
PH UA: 6.5 (ref 5–8)
PLATELET # BLD: 273 K/UL (ref 138–453)
PLATELET ESTIMATE: NORMAL
PMV BLD AUTO: 10.3 FL (ref 8.1–13.5)
POTASSIUM SERPL-SCNC: 4.4 MMOL/L (ref 3.7–5.3)
PROTEIN UA: NEGATIVE
RBC # BLD: 4.65 M/UL (ref 3.95–5.11)
RBC # BLD: NORMAL 10*6/UL
SEG NEUTROPHILS: 55 % (ref 36–65)
SEGMENTED NEUTROPHILS ABSOLUTE COUNT: 3.09 K/UL (ref 1.5–8.1)
SODIUM BLD-SCNC: 142 MMOL/L (ref 135–144)
SPECIFIC GRAVITY UA: 1.02 (ref 1–1.03)
TOTAL PROTEIN: 6.8 G/DL (ref 6.4–8.3)
TRIGLYCERIDE, FASTING: 124 MG/DL
TURBIDITY: CLEAR
URINE HGB: NEGATIVE
UROBILINOGEN, URINE: NORMAL
VLDLC SERPL CALC-MCNC: NORMAL MG/DL (ref 1–30)
WBC # BLD: 5.7 K/UL (ref 3.5–11.3)
WBC # BLD: NORMAL 10*3/UL

## 2021-10-26 ENCOUNTER — HOSPITAL ENCOUNTER (OUTPATIENT)
Age: 66
Setting detail: SPECIMEN
Discharge: HOME | End: 2021-10-26
Payer: MEDICARE

## 2021-10-27 LAB
ESTIMATED AVERAGE GLUCOSE: 128 MG/DL
HBA1C MFR BLD: 6.1 % (ref 4–6)

## 2022-02-02 ENCOUNTER — HOSPITAL ENCOUNTER (OUTPATIENT)
Age: 67
Setting detail: SPECIMEN
Discharge: HOME | End: 2022-02-02

## 2022-02-02 LAB
ESTIMATED AVERAGE GLUCOSE: 134 MG/DL
HBA1C MFR BLD: 6.3 % (ref 4–6)
THYROXINE, FREE: 1.63 NG/DL (ref 0.93–1.7)
TSH SERPL DL<=0.05 MIU/L-ACNC: 0.78 MIU/L (ref 0.3–5)

## 2022-04-20 ENCOUNTER — HOSPITAL ENCOUNTER (OUTPATIENT)
Dept: WOMENS IMAGING | Age: 67
Discharge: HOME | End: 2022-04-22
Payer: MEDICARE

## 2022-04-20 DIAGNOSIS — N64.52 NIPPLE DISCHARGE: ICD-10-CM

## 2022-04-20 PROCEDURE — G0279 TOMOSYNTHESIS, MAMMO: HCPCS

## 2022-04-20 PROCEDURE — 76642 ULTRASOUND BREAST LIMITED: CPT

## 2022-11-11 ENCOUNTER — HOSPITAL ENCOUNTER (OUTPATIENT)
Dept: WOMENS IMAGING | Age: 67
Discharge: HOME | End: 2022-11-13
Payer: MEDICARE

## 2022-11-11 DIAGNOSIS — R92.8 ABNORMAL MAMMOGRAM OF LEFT BREAST: ICD-10-CM

## 2022-11-11 PROCEDURE — 76642 ULTRASOUND BREAST LIMITED: CPT

## 2022-12-12 ENCOUNTER — HOSPITAL ENCOUNTER (OUTPATIENT)
Dept: WOMENS IMAGING | Age: 67
Discharge: HOME | End: 2022-12-14
Payer: MEDICARE

## 2022-12-12 DIAGNOSIS — Z12.31 ENCOUNTER FOR SCREENING MAMMOGRAM FOR MALIGNANT NEOPLASM OF BREAST: ICD-10-CM

## 2022-12-12 PROCEDURE — 77067 SCR MAMMO BI INCL CAD: CPT

## 2024-02-08 ENCOUNTER — HOSPITAL ENCOUNTER (OUTPATIENT)
Dept: WOMENS IMAGING | Age: 69
Discharge: HOME | End: 2024-02-10
Attending: INTERNAL MEDICINE
Payer: MEDICARE

## 2024-02-08 DIAGNOSIS — N64.4 BREAST PAIN: ICD-10-CM

## 2024-02-08 DIAGNOSIS — N64.52 BLOODY DISCHARGE FROM NIPPLE: ICD-10-CM

## 2024-02-08 PROCEDURE — G0279 TOMOSYNTHESIS, MAMMO: HCPCS

## 2024-02-08 PROCEDURE — 76642 ULTRASOUND BREAST LIMITED: CPT

## 2025-02-12 ENCOUNTER — HOSPITAL ENCOUNTER (OUTPATIENT)
Dept: WOMENS IMAGING | Age: 70
Discharge: HOME | End: 2025-02-14
Attending: INTERNAL MEDICINE
Payer: MEDICARE

## 2025-02-12 DIAGNOSIS — Z12.31 ENCOUNTER FOR SCREENING MAMMOGRAM FOR MALIGNANT NEOPLASM OF BREAST: ICD-10-CM

## 2025-02-12 DIAGNOSIS — E21.3 HYPERPARATHYROIDISM (HCC): ICD-10-CM

## 2025-02-12 PROCEDURE — 77063 BREAST TOMOSYNTHESIS BI: CPT

## 2025-02-12 PROCEDURE — 77080 DXA BONE DENSITY AXIAL: CPT
